# Patient Record
Sex: FEMALE | Race: WHITE | NOT HISPANIC OR LATINO | Employment: OTHER | URBAN - METROPOLITAN AREA
[De-identification: names, ages, dates, MRNs, and addresses within clinical notes are randomized per-mention and may not be internally consistent; named-entity substitution may affect disease eponyms.]

---

## 2022-02-01 ENCOUNTER — PRE-ADMISSION TESTING (OUTPATIENT)
Dept: PREADMISSION TESTING | Facility: HOSPITAL | Age: 57
End: 2022-02-01

## 2022-02-01 ENCOUNTER — HOSPITAL ENCOUNTER (OUTPATIENT)
Dept: GENERAL RADIOLOGY | Facility: HOSPITAL | Age: 57
Discharge: HOME | End: 2022-02-01

## 2022-02-01 VITALS
WEIGHT: 232.4 LBS | RESPIRATION RATE: 18 BRPM | SYSTOLIC BLOOD PRESSURE: 145 MMHG | HEIGHT: 66 IN | BODY MASS INDEX: 37.35 KG/M2 | HEART RATE: 89 BPM | DIASTOLIC BLOOD PRESSURE: 81 MMHG | TEMPERATURE: 98.8 F | OXYGEN SATURATION: 99 %

## 2022-02-01 LAB
ABO GROUP BLD: NORMAL
ALBUMIN SERPL-MCNC: 4.2 G/DL (ref 3.5–5.2)
ALBUMIN/GLOB SERPL: 1.7 G/DL
ALP SERPL-CCNC: 113 U/L (ref 39–117)
ALT SERPL W P-5'-P-CCNC: 20 U/L (ref 1–33)
ANION GAP SERPL CALCULATED.3IONS-SCNC: 11.1 MMOL/L (ref 5–15)
APTT PPP: 27.2 SECONDS (ref 22.7–35.4)
AST SERPL-CCNC: 28 U/L (ref 1–32)
BASOPHILS # BLD AUTO: 0.05 10*3/MM3 (ref 0–0.2)
BASOPHILS NFR BLD AUTO: 0.7 % (ref 0–1.5)
BILIRUB SERPL-MCNC: 0.3 MG/DL (ref 0–1.2)
BLD GP AB SCN SERPL QL: NEGATIVE
BUN SERPL-MCNC: 14 MG/DL (ref 6–20)
BUN/CREAT SERPL: 19.4 (ref 7–25)
CALCIUM SPEC-SCNC: 9.8 MG/DL (ref 8.6–10.5)
CHLORIDE SERPL-SCNC: 102 MMOL/L (ref 98–107)
CO2 SERPL-SCNC: 24.9 MMOL/L (ref 22–29)
CREAT SERPL-MCNC: 0.72 MG/DL (ref 0.57–1)
DEPRECATED RDW RBC AUTO: 42.3 FL (ref 37–54)
EOSINOPHIL # BLD AUTO: 0.5 10*3/MM3 (ref 0–0.4)
EOSINOPHIL NFR BLD AUTO: 6.6 % (ref 0.3–6.2)
ERYTHROCYTE [DISTWIDTH] IN BLOOD BY AUTOMATED COUNT: 14.6 % (ref 12.3–15.4)
GFR SERPL CREATININE-BSD FRML MDRD: 83 ML/MIN/1.73
GLOBULIN UR ELPH-MCNC: 2.5 GM/DL
GLUCOSE SERPL-MCNC: 201 MG/DL (ref 65–99)
HBA1C MFR BLD: 8.6 % (ref 4.8–5.6)
HCT VFR BLD AUTO: 35.5 % (ref 34–46.6)
HGB BLD-MCNC: 11.2 G/DL (ref 12–15.9)
IMM GRANULOCYTES # BLD AUTO: 0.02 10*3/MM3 (ref 0–0.05)
IMM GRANULOCYTES NFR BLD AUTO: 0.3 % (ref 0–0.5)
INR PPP: 1 (ref 0.9–1.1)
LYMPHOCYTES # BLD AUTO: 2.05 10*3/MM3 (ref 0.7–3.1)
LYMPHOCYTES NFR BLD AUTO: 26.9 % (ref 19.6–45.3)
MCH RBC QN AUTO: 25.2 PG (ref 26.6–33)
MCHC RBC AUTO-ENTMCNC: 31.5 G/DL (ref 31.5–35.7)
MCV RBC AUTO: 80 FL (ref 79–97)
MONOCYTES # BLD AUTO: 0.66 10*3/MM3 (ref 0.1–0.9)
MONOCYTES NFR BLD AUTO: 8.7 % (ref 5–12)
NEUTROPHILS NFR BLD AUTO: 4.35 10*3/MM3 (ref 1.7–7)
NEUTROPHILS NFR BLD AUTO: 56.8 % (ref 42.7–76)
NRBC BLD AUTO-RTO: 0 /100 WBC (ref 0–0.2)
PLATELET # BLD AUTO: 257 10*3/MM3 (ref 140–450)
PMV BLD AUTO: 11.8 FL (ref 6–12)
POTASSIUM SERPL-SCNC: 4.9 MMOL/L (ref 3.5–5.2)
PROT SERPL-MCNC: 6.7 G/DL (ref 6–8.5)
PROTHROMBIN TIME: 13.1 SECONDS (ref 11.7–14.2)
RBC # BLD AUTO: 4.44 10*6/MM3 (ref 3.77–5.28)
RH BLD: NEGATIVE
SODIUM SERPL-SCNC: 138 MMOL/L (ref 136–145)
T&S EXPIRATION DATE: NORMAL
WBC NRBC COR # BLD: 7.63 10*3/MM3 (ref 3.4–10.8)

## 2022-02-01 PROCEDURE — 80053 COMPREHEN METABOLIC PANEL: CPT

## 2022-02-01 PROCEDURE — 85610 PROTHROMBIN TIME: CPT

## 2022-02-01 PROCEDURE — 85025 COMPLETE CBC W/AUTO DIFF WBC: CPT

## 2022-02-01 PROCEDURE — 85730 THROMBOPLASTIN TIME PARTIAL: CPT

## 2022-02-01 PROCEDURE — 86900 BLOOD TYPING SEROLOGIC ABO: CPT

## 2022-02-01 PROCEDURE — 83036 HEMOGLOBIN GLYCOSYLATED A1C: CPT

## 2022-02-01 PROCEDURE — 86850 RBC ANTIBODY SCREEN: CPT

## 2022-02-01 PROCEDURE — 86901 BLOOD TYPING SEROLOGIC RH(D): CPT

## 2022-02-01 PROCEDURE — 36415 COLL VENOUS BLD VENIPUNCTURE: CPT

## 2022-02-01 PROCEDURE — 71046 X-RAY EXAM CHEST 2 VIEWS: CPT

## 2022-02-01 RX ORDER — MELATONIN
1000 DAILY
COMMUNITY

## 2022-02-01 RX ORDER — AMITRIPTYLINE HYDROCHLORIDE 25 MG/1
25 TABLET, FILM COATED ORAL NIGHTLY
COMMUNITY
Start: 2021-11-24

## 2022-02-01 RX ORDER — MOMETASONE FUROATE 50 UG/1
2 SPRAY, METERED NASAL AS NEEDED
COMMUNITY

## 2022-02-01 RX ORDER — MONTELUKAST SODIUM 10 MG/1
1 TABLET ORAL NIGHTLY
COMMUNITY
Start: 2021-08-27

## 2022-02-01 RX ORDER — LANOLIN ALCOHOL/MO/W.PET/CERES
3 CREAM (GRAM) TOPICAL NIGHTLY
COMMUNITY

## 2022-02-01 RX ORDER — PRAVASTATIN SODIUM 80 MG/1
80 TABLET ORAL NIGHTLY
COMMUNITY
Start: 2021-08-09 | End: 2022-05-06

## 2022-02-01 RX ORDER — OMEPRAZOLE 40 MG/1
40 CAPSULE, DELAYED RELEASE ORAL DAILY
COMMUNITY
Start: 2021-11-16

## 2022-02-01 RX ORDER — ZOLPIDEM TARTRATE 10 MG/1
10 TABLET ORAL NIGHTLY
COMMUNITY
Start: 2022-01-24

## 2022-02-01 RX ORDER — HYDROCODONE BITARTRATE AND ACETAMINOPHEN 7.5; 325 MG/1; MG/1
1 TABLET ORAL EVERY 6 HOURS PRN
COMMUNITY
End: 2022-02-16 | Stop reason: HOSPADM

## 2022-02-01 RX ORDER — PROMETHAZINE HYDROCHLORIDE 25 MG/1
25 TABLET ORAL EVERY 6 HOURS PRN
COMMUNITY

## 2022-02-01 RX ORDER — DICLOFENAC SODIUM 75 MG/1
75 TABLET, DELAYED RELEASE ORAL 2 TIMES DAILY
COMMUNITY

## 2022-02-01 RX ORDER — LEVOTHYROXINE SODIUM 0.15 MG/1
150 TABLET ORAL DAILY
COMMUNITY
Start: 2021-12-03

## 2022-02-01 RX ORDER — MAGNESIUM OXIDE 400 MG/1
500 TABLET ORAL DAILY
COMMUNITY

## 2022-02-01 RX ORDER — ATENOLOL 50 MG/1
50 TABLET ORAL DAILY
COMMUNITY
Start: 2022-01-05 | End: 2022-04-05

## 2022-02-01 RX ORDER — AMOXICILLIN 250 MG
2 CAPSULE ORAL 2 TIMES DAILY PRN
COMMUNITY

## 2022-02-01 RX ORDER — RIZATRIPTAN BENZOATE 10 MG/1
10 TABLET, ORALLY DISINTEGRATING ORAL ONCE AS NEEDED
COMMUNITY

## 2022-02-01 RX ORDER — METFORMIN HYDROCHLORIDE 500 MG/1
2 TABLET, EXTENDED RELEASE ORAL 2 TIMES DAILY
COMMUNITY
Start: 2021-11-16

## 2022-02-01 RX ORDER — CYCLOBENZAPRINE HCL 10 MG
10 TABLET ORAL 2 TIMES DAILY
COMMUNITY
Start: 2021-11-29

## 2022-02-01 ASSESSMENT — KOOS JR
KOOS JR SCORE: 47.487
KOOS JR SCORE: 16

## 2022-02-01 NOTE — DISCHARGE INSTRUCTIONS
Take the following medications the morning of surgery:  ATENOLOL,LEVOTHYROXINE AND OMEPRAZOLE    If you are on prescription narcotic pain medication to control your pain you may also take that medication the morning of surgery.    General Instructions:  • Do not eat solid food after midnight the night before surgery.  • You may drink clear liquids day of surgery but must stop at least one hour before your hospital arrival time.  CLEAR LIQUIDS UNTIL 4:15 AM  • It is beneficial for you to have a clear drink that contains carbohydrates the day of surgery.  We suggest a 12 to 20 ounce bottle of Gatorade or Powerade for non-diabetic patients or a 12 to 20 ounce bottle of G2 or Powerade Zero for diabetic patients. (Pediatric patients, are not advised to drink a 12 to 20 ounce carbohydrate drink)    Clear liquids are liquids you can see through.  Nothing red in color.     Plain water                               Sports drinks  Sodas                                   Gelatin (Jell-O)  Fruit juices without pulp such as white grape juice and apple juice  Popsicles that contain no fruit or yogurt  Tea or coffee (no cream or milk added)  Gatorade / Powerade  G2 / Powerade Zero    • Infants may have breast milk up to four hours before surgery.  • Infants drinking formula may drink formula up to six hours before surgery.   • Patients who avoid smoking, chewing tobacco and alcohol for 4 weeks prior to surgery have a reduced risk of post-operative complications.  Quit smoking as many days before surgery as you can.  • Do not smoke, use chewing tobacco or drink alcohol the day of surgery.   • If applicable bring your C-PAP/ BI-PAP machine.  • Bring any papers given to you in the doctor’s office.  • Wear clean comfortable clothes.  • Do not wear contact lenses, false eyelashes or make-up.  Bring a case for your glasses.   • Bring crutches or walker if applicable.  • Remove all piercings.  Leave jewelry and any other valuables at  home.  • Hair extensions with metal clips must be removed prior to surgery.  • The Pre-Admission Testing nurse will instruct you to bring medications if unable to obtain an accurate list in Pre-Admission Testing.        If you were given a blood bank ID arm band remember to bring it with you the day of surgery.    Preventing a Surgical Site Infection:  • For 2 to 3 days before surgery, avoid shaving with a razor because the razor can irritate skin and make it easier to develop an infection.    • Any areas of open skin can increase the risk of a post-operative wound infection by allowing bacteria to enter and travel throughout the body.  Notify your surgeon if you have any skin wounds / rashes even if it is not near the expected surgical site.  The area will need assessed to determine if surgery should be delayed until it is healed.  • The night prior to surgery shower using a fresh bar of anti-bacterial soap (such as Dial) and clean washcloth.  Sleep in a clean bed with clean clothing.  Do not allow pets to sleep with you.  • Shower on the morning of surgery using a fresh bar of anti-bacterial soap (such as Dial) and clean washcloth.  Dry with a clean towel and dress in clean clothing.  • Ask your surgeon if you will be receiving antibiotics prior to surgery.  • Make sure you, your family, and all healthcare providers clean their hands with soap and water or an alcohol based hand  before caring for you or your wound.    Day of surgery: 2/15/2022 ARRIVAL TIME 5:15 AM  Your arrival time is approximately two hours before your scheduled surgery time.  Upon arrival, a Pre-op nurse and Anesthesiologist will review your health history, obtain vital signs, and answer questions you may have.  The only belongings needed at this time will be a list of your home medications and if applicable your C-PAP/BI-PAP machine.  A Pre-op nurse will start an IV and you may receive medication in preparation for surgery, including  something to help you relax.     Please be aware that surgery does come with discomfort.  We want to make every effort to control your discomfort so please discuss any uncontrolled symptoms with your nurse.   Your doctor will most likely have prescribed pain medications.      If you are going home after surgery you will receive individualized written care instructions before being discharged.  A responsible adult must drive you to and from the hospital on the day of your surgery and stay with you for 24 hours.  Discharge prescriptions can be filled by the hospital pharmacy during regular pharmacy hours.  If you are having surgery late in the day/evening your prescription may be e-prescribed to your pharmacy.  Please verify your pharmacy hours or chose a 24 hour pharmacy to avoid not having access to your prescription because your pharmacy has closed for the day.    If you are staying overnight following surgery, you will be transported to your hospital room following the recovery period.  [facility] has all private rooms.    If you have any questions please call Pre-Admission Testing at [phone].  Deductibles and co-payments are collected on the day of service. Please be prepared to pay the required co-pay, deductible or deposit on the day of service as defined by your plan.    Patient Education for Self-Quarantine Process    • Following your COVID testing, we strongly recommend that you wear a mask when you are with other people and practice social distancing.   • Limit your activities to only required outings.  • Wash your hands with soap and water frequently for at least 20 seconds.   • Avoid touching your eyes, nose and mouth with unwashed hands.  • Do not share anything - utensils, drinking glasses, food from the same bowl.   • Sanitize household surfaces daily. Include all high touch areas (door handles, light switches, phones, countertops, etc.)    Call your surgeon immediately if you  experience any of the following symptoms:  • Sore Throat  • Shortness of Breath or difficulty breathing  • Cough  • Chills  • Body soreness or muscle pain  • Headache  • Fever  • New loss of taste or smell  • Do not arrive for your surgery ill.  Your procedure will need to be rescheduled to another time.  You will need to call your physician before the day of surgery to avoid any unnecessary exposure to hospital staff as well as other patients.    CHLORHEXIDINE CLOTH INSTRUCTIONS  The morning of surgery follow these instructions using the Chlorhexidine cloths you've been given.  These steps reduce bacteria on the body.  Do not use the cloths near your eyes, ears mouth, genitalia or on open wounds.  Throw the cloths away after use but do not try to flush them down a toilet.      • Open and remove one cloth at a time from the package.    • Leave the cloth unfolded and begin the bathing.  • Massage the skin with the cloths using gentle pressure to remove bacteria.  Do not scrub harshly.   • Follow the steps below with one 2% CHG cloth per area (6 total cloths).  • One cloth for neck, shoulders and chest.  • One cloth for both arms, hands, fingers and underarms (do underarms last).  • One cloth for the abdomen followed by groin.  • One cloth for right leg and foot including between the toes.  • One cloth for left leg and foot including between the toes.  • The last cloth is to be used for the back of the neck, back and buttocks.    Allow the CHG to air dry 3 minutes on the skin which will give it time to work and decrease the chance of irritation.  The skin may feel sticky until it is dry.  Do not rinse with water or any other liquid or you will lose the beneficial effects of the CHG.  If mild skin irritation occurs, do rinse the skin to remove the CHG.  Report this to the nurse at time of admission.  Do not apply lotions, creams, ointments, deodorants or perfumes after using the clothes. Dress in clean clothes before  coming to the hospital.    BACTROBAN NASAL OINTMENT  There are many germs normally in your nose. Bactroban is an ointment that will help reduce these germs. Please follow these instructions for Bactroban use:      ____The day before surgery in the morning  Date_2/14_______    ____The day before surgery in the evening              Date_2/14_______    ____The day of surgery in the morning    Date_2/15_______    **Squirt ½ package of Bactroban Ointment onto a cotton applicator and apply to inside of 1st nostril.  Squirt the remaining Bactroban and apply to the inside of the other nostril.

## 2022-02-12 ENCOUNTER — LAB (OUTPATIENT)
Dept: LAB | Facility: HOSPITAL | Age: 57
End: 2022-02-12

## 2022-02-12 LAB — SARS-COV-2 ORF1AB RESP QL NAA+PROBE: NOT DETECTED

## 2022-02-12 PROCEDURE — U0004 COV-19 TEST NON-CDC HGH THRU: HCPCS

## 2022-02-12 PROCEDURE — C9803 HOPD COVID-19 SPEC COLLECT: HCPCS

## 2022-02-15 ENCOUNTER — APPOINTMENT (OUTPATIENT)
Dept: GENERAL RADIOLOGY | Facility: HOSPITAL | Age: 57
End: 2022-02-15

## 2022-02-15 ENCOUNTER — HOSPITAL ENCOUNTER (OUTPATIENT)
Facility: HOSPITAL | Age: 57
Discharge: HOME | End: 2022-02-16
Attending: ORTHOPAEDIC SURGERY | Admitting: ORTHOPAEDIC SURGERY

## 2022-02-15 ENCOUNTER — ANESTHESIA (OUTPATIENT)
Dept: PERIOP | Facility: HOSPITAL | Age: 57
End: 2022-02-15

## 2022-02-15 ENCOUNTER — ANESTHESIA EVENT (OUTPATIENT)
Dept: PERIOP | Facility: HOSPITAL | Age: 57
End: 2022-02-15

## 2022-02-15 DIAGNOSIS — Z96.651 STATUS POST TOTAL KNEE REPLACEMENT, RIGHT: Primary | ICD-10-CM

## 2022-02-15 LAB
ABO GROUP BLD: NORMAL
BLD GP AB SCN SERPL QL: NEGATIVE
GLUCOSE BLDC GLUCOMTR-MCNC: 130 MG/DL (ref 70–130)
GLUCOSE BLDC GLUCOMTR-MCNC: 183 MG/DL (ref 70–130)
GLUCOSE BLDC GLUCOMTR-MCNC: 231 MG/DL (ref 70–130)
GLUCOSE BLDC GLUCOMTR-MCNC: 254 MG/DL (ref 70–130)
RH BLD: NEGATIVE
T&S EXPIRATION DATE: NORMAL

## 2022-02-15 PROCEDURE — 0 CEFAZOLIN IN DEXTROSE 2-4 GM/100ML-% SOLUTION: Performed by: ORTHOPAEDIC SURGERY

## 2022-02-15 PROCEDURE — 86850 RBC ANTIBODY SCREEN: CPT | Performed by: ORTHOPAEDIC SURGERY

## 2022-02-15 PROCEDURE — 97161 PT EVAL LOW COMPLEX 20 MIN: CPT

## 2022-02-15 PROCEDURE — A9270 NON-COVERED ITEM OR SERVICE: HCPCS | Performed by: ORTHOPAEDIC SURGERY

## 2022-02-15 PROCEDURE — 63710000001 AMITRIPTYLINE 25 MG TABLET: Performed by: ORTHOPAEDIC SURGERY

## 2022-02-15 PROCEDURE — 25010000002 PROPOFOL 10 MG/ML EMULSION: Performed by: NURSE ANESTHETIST, CERTIFIED REGISTERED

## 2022-02-15 PROCEDURE — 25010000002 FENTANYL CITRATE (PF) 50 MCG/ML SOLUTION: Performed by: NURSE ANESTHETIST, CERTIFIED REGISTERED

## 2022-02-15 PROCEDURE — 0 BUPIVACAINE LIPOSOME 1.3 % SUSPENSION 20 ML VIAL: Performed by: ORTHOPAEDIC SURGERY

## 2022-02-15 PROCEDURE — 25010000002 ROPIVACAINE PER 1 MG: Performed by: ANESTHESIOLOGY

## 2022-02-15 PROCEDURE — 63710000001 PRAVASTATIN 40 MG TABLET: Performed by: ORTHOPAEDIC SURGERY

## 2022-02-15 PROCEDURE — 25010000002 ONDANSETRON PER 1 MG: Performed by: NURSE ANESTHETIST, CERTIFIED REGISTERED

## 2022-02-15 PROCEDURE — G0378 HOSPITAL OBSERVATION PER HR: HCPCS

## 2022-02-15 PROCEDURE — 63710000001 CHOLECALCIFEROL 25 MCG (1000 UT) TABLET: Performed by: ORTHOPAEDIC SURGERY

## 2022-02-15 PROCEDURE — 86900 BLOOD TYPING SEROLOGIC ABO: CPT | Performed by: ORTHOPAEDIC SURGERY

## 2022-02-15 PROCEDURE — 73560 X-RAY EXAM OF KNEE 1 OR 2: CPT

## 2022-02-15 PROCEDURE — C1776 JOINT DEVICE (IMPLANTABLE): HCPCS | Performed by: ORTHOPAEDIC SURGERY

## 2022-02-15 PROCEDURE — 25010000002 DEXAMETHASONE PER 1 MG: Performed by: NURSE ANESTHETIST, CERTIFIED REGISTERED

## 2022-02-15 PROCEDURE — C1713 ANCHOR/SCREW BN/BN,TIS/BN: HCPCS | Performed by: ORTHOPAEDIC SURGERY

## 2022-02-15 PROCEDURE — 63710000001 SENNOSIDES-DOCUSATE 8.6-50 MG TABLET: Performed by: ORTHOPAEDIC SURGERY

## 2022-02-15 PROCEDURE — C9290 INJ, BUPIVACAINE LIPOSOME: HCPCS | Performed by: ORTHOPAEDIC SURGERY

## 2022-02-15 PROCEDURE — 63710000001 METFORMIN ER 500 MG TABLET SUSTAINED-RELEASE 24 HOUR: Performed by: ORTHOPAEDIC SURGERY

## 2022-02-15 PROCEDURE — 63710000001 ZOLPIDEM 5 MG TABLET: Performed by: ORTHOPAEDIC SURGERY

## 2022-02-15 PROCEDURE — 63710000001 CALCIUM CARBONATE (OYSTER SHELL) 500 MG TABLET: Performed by: ORTHOPAEDIC SURGERY

## 2022-02-15 PROCEDURE — C1889 IMPLANT/INSERT DEVICE, NOC: HCPCS | Performed by: ORTHOPAEDIC SURGERY

## 2022-02-15 PROCEDURE — 25010000002 MIDAZOLAM PER 1 MG: Performed by: ANESTHESIOLOGY

## 2022-02-15 PROCEDURE — 63710000001 INSULIN LISPRO (HUMAN) PER 5 UNITS: Performed by: ORTHOPAEDIC SURGERY

## 2022-02-15 PROCEDURE — 63710000001 MAGNESIUM OXIDE 400 MG TABLET: Performed by: ORTHOPAEDIC SURGERY

## 2022-02-15 PROCEDURE — 63710000001 MONTELUKAST 10 MG TABLET: Performed by: ORTHOPAEDIC SURGERY

## 2022-02-15 PROCEDURE — 82962 GLUCOSE BLOOD TEST: CPT

## 2022-02-15 PROCEDURE — 0 MEPIVACAINE HCL (PF) 1.5 % SOLUTION: Performed by: ANESTHESIOLOGY

## 2022-02-15 PROCEDURE — 76942 ECHO GUIDE FOR BIOPSY: CPT | Performed by: ORTHOPAEDIC SURGERY

## 2022-02-15 PROCEDURE — 63710000001 DIAZEPAM 5 MG TABLET: Performed by: ORTHOPAEDIC SURGERY

## 2022-02-15 PROCEDURE — 63710000001 FLUTICASONE 50 MCG/ACT SUSPENSION 16 G BOTTLE: Performed by: ORTHOPAEDIC SURGERY

## 2022-02-15 PROCEDURE — 86901 BLOOD TYPING SEROLOGIC RH(D): CPT | Performed by: ORTHOPAEDIC SURGERY

## 2022-02-15 PROCEDURE — 97110 THERAPEUTIC EXERCISES: CPT

## 2022-02-15 PROCEDURE — 63710000001 POVIDONE-IODINE 10 % SOLUTION 15 ML BOTTLE: Performed by: ORTHOPAEDIC SURGERY

## 2022-02-15 PROCEDURE — 25010000002 FENTANYL CITRATE (PF) 50 MCG/ML SOLUTION: Performed by: ANESTHESIOLOGY

## 2022-02-15 PROCEDURE — 25010000002 NEOSTIGMINE 5 MG/10ML SOLUTION: Performed by: NURSE ANESTHETIST, CERTIFIED REGISTERED

## 2022-02-15 PROCEDURE — 25010000002 KETOROLAC TROMETHAMINE PER 15 MG: Performed by: ORTHOPAEDIC SURGERY

## 2022-02-15 PROCEDURE — 25010000002 HYDROMORPHONE PER 4 MG: Performed by: NURSE ANESTHETIST, CERTIFIED REGISTERED

## 2022-02-15 PROCEDURE — 63710000001 HYDROCODONE-ACETAMINOPHEN 10-325 MG TABLET: Performed by: ORTHOPAEDIC SURGERY

## 2022-02-15 DEVICE — VIOLET ANTIBACTERIAL POLYDIOXANONE, KNOTLESS TISSUE CONTROL DEVICE
Type: IMPLANTABLE DEVICE | Site: KNEE | Status: FUNCTIONAL
Brand: STRATAFIX

## 2022-02-15 DEVICE — CAP TOTL KN CMT PRIMARY: Type: IMPLANTABLE DEVICE | Site: KNEE | Status: FUNCTIONAL

## 2022-02-15 DEVICE — ART/SRF KN PERSONA PS EF MC 6TO7 10MM RT: Type: IMPLANTABLE DEVICE | Site: KNEE | Status: FUNCTIONAL

## 2022-02-15 DEVICE — IMPLANTABLE DEVICE
Type: IMPLANTABLE DEVICE | Site: KNEE | Status: FUNCTIONAL
Brand: BIOMET® BONE CEMENT R

## 2022-02-15 DEVICE — EXT STEM FEM/KN PERSONA TPR 14XPLS30MM: Type: IMPLANTABLE DEVICE | Site: KNEE | Status: FUNCTIONAL

## 2022-02-15 DEVICE — PAT KN PERSONA VE CRS/LNK CMT 8.5X32MM: Type: IMPLANTABLE DEVICE | Site: KNEE | Status: FUNCTIONAL

## 2022-02-15 DEVICE — CAP EXT STEM KN UPCHRG: Type: IMPLANTABLE DEVICE | Site: KNEE | Status: FUNCTIONAL

## 2022-02-15 DEVICE — KNOTLESS TISSUE CONTROL DEVICE, UNDYED UNIDIRECTIONAL (ANTIBACTERIAL) SYNTHETIC ABSORBABLE DEVICE
Type: IMPLANTABLE DEVICE | Site: KNEE | Status: FUNCTIONAL
Brand: STRATAFIX

## 2022-02-15 DEVICE — COMP FEM/KN PERSONA CR CMT COCR STD SZ7 RT: Type: IMPLANTABLE DEVICE | Site: KNEE | Status: FUNCTIONAL

## 2022-02-15 DEVICE — STEM TIB/KN PERSONA CMT 5D SZE RT: Type: IMPLANTABLE DEVICE | Site: KNEE | Status: FUNCTIONAL

## 2022-02-15 RX ORDER — NICOTINE POLACRILEX 4 MG
15 LOZENGE BUCCAL
Status: DISCONTINUED | OUTPATIENT
Start: 2022-02-15 | End: 2022-02-16 | Stop reason: HOSPADM

## 2022-02-15 RX ORDER — PROMETHAZINE HYDROCHLORIDE 12.5 MG/1
12.5 TABLET ORAL EVERY 6 HOURS PRN
Status: DISCONTINUED | OUTPATIENT
Start: 2022-02-15 | End: 2022-02-16 | Stop reason: HOSPADM

## 2022-02-15 RX ORDER — MAGNESIUM OXIDE 400 MG/1
400 TABLET ORAL DAILY
Status: DISCONTINUED | OUTPATIENT
Start: 2022-02-15 | End: 2022-02-16 | Stop reason: HOSPADM

## 2022-02-15 RX ORDER — CEFAZOLIN SODIUM 2 G/100ML
2 INJECTION, SOLUTION INTRAVENOUS ONCE
Status: COMPLETED | OUTPATIENT
Start: 2022-02-15 | End: 2022-02-15

## 2022-02-15 RX ORDER — DEXTROSE MONOHYDRATE 25 G/50ML
25 INJECTION, SOLUTION INTRAVENOUS
Status: DISCONTINUED | OUTPATIENT
Start: 2022-02-15 | End: 2022-02-16 | Stop reason: HOSPADM

## 2022-02-15 RX ORDER — OXYCODONE AND ACETAMINOPHEN 7.5; 325 MG/1; MG/1
1 TABLET ORAL EVERY 4 HOURS PRN
Status: DISCONTINUED | OUTPATIENT
Start: 2022-02-15 | End: 2022-02-15 | Stop reason: HOSPADM

## 2022-02-15 RX ORDER — KETAMINE HYDROCHLORIDE 10 MG/ML
INJECTION INTRAMUSCULAR; INTRAVENOUS AS NEEDED
Status: DISCONTINUED | OUTPATIENT
Start: 2022-02-15 | End: 2022-02-15 | Stop reason: SURG

## 2022-02-15 RX ORDER — METFORMIN HYDROCHLORIDE 500 MG/1
1000 TABLET, EXTENDED RELEASE ORAL 2 TIMES DAILY
Status: DISCONTINUED | OUTPATIENT
Start: 2022-02-15 | End: 2022-02-16 | Stop reason: HOSPADM

## 2022-02-15 RX ORDER — PROMETHAZINE HYDROCHLORIDE 25 MG/1
25 SUPPOSITORY RECTAL ONCE AS NEEDED
Status: DISCONTINUED | OUTPATIENT
Start: 2022-02-15 | End: 2022-02-15 | Stop reason: HOSPADM

## 2022-02-15 RX ORDER — MELATONIN
1000 DAILY
Status: DISCONTINUED | OUTPATIENT
Start: 2022-02-15 | End: 2022-02-16 | Stop reason: HOSPADM

## 2022-02-15 RX ORDER — DEXAMETHASONE SODIUM PHOSPHATE 10 MG/ML
INJECTION INTRAMUSCULAR; INTRAVENOUS AS NEEDED
Status: DISCONTINUED | OUTPATIENT
Start: 2022-02-15 | End: 2022-02-15 | Stop reason: SURG

## 2022-02-15 RX ORDER — MONTELUKAST SODIUM 10 MG/1
10 TABLET ORAL NIGHTLY
Status: DISCONTINUED | OUTPATIENT
Start: 2022-02-15 | End: 2022-02-16 | Stop reason: HOSPADM

## 2022-02-15 RX ORDER — CEFAZOLIN SODIUM 2 G/100ML
2 INJECTION, SOLUTION INTRAVENOUS EVERY 8 HOURS
Status: COMPLETED | OUTPATIENT
Start: 2022-02-15 | End: 2022-02-16

## 2022-02-15 RX ORDER — FLUMAZENIL 0.1 MG/ML
0.2 INJECTION INTRAVENOUS AS NEEDED
Status: DISCONTINUED | OUTPATIENT
Start: 2022-02-15 | End: 2022-02-15 | Stop reason: HOSPADM

## 2022-02-15 RX ORDER — NEOSTIGMINE METHYLSULFATE 0.5 MG/ML
INJECTION, SOLUTION INTRAVENOUS AS NEEDED
Status: DISCONTINUED | OUTPATIENT
Start: 2022-02-15 | End: 2022-02-15 | Stop reason: SURG

## 2022-02-15 RX ORDER — ONDANSETRON 4 MG/1
4 TABLET, FILM COATED ORAL EVERY 6 HOURS PRN
Status: DISCONTINUED | OUTPATIENT
Start: 2022-02-15 | End: 2022-02-16 | Stop reason: HOSPADM

## 2022-02-15 RX ORDER — OMEGA-3S/DHA/EPA/FISH OIL 1000-1400
3 CAPSULE,DELAYED RELEASE (ENTERIC COATED) ORAL DAILY
COMMUNITY

## 2022-02-15 RX ORDER — LABETALOL HYDROCHLORIDE 5 MG/ML
5 INJECTION, SOLUTION INTRAVENOUS
Status: DISCONTINUED | OUTPATIENT
Start: 2022-02-15 | End: 2022-02-15 | Stop reason: HOSPADM

## 2022-02-15 RX ORDER — SODIUM CHLORIDE 0.9 % (FLUSH) 0.9 %
10 SYRINGE (ML) INJECTION EVERY 12 HOURS SCHEDULED
Status: DISCONTINUED | OUTPATIENT
Start: 2022-02-15 | End: 2022-02-15 | Stop reason: HOSPADM

## 2022-02-15 RX ORDER — SODIUM CHLORIDE 0.9 % (FLUSH) 0.9 %
10 SYRINGE (ML) INJECTION AS NEEDED
Status: DISCONTINUED | OUTPATIENT
Start: 2022-02-15 | End: 2022-02-15 | Stop reason: HOSPADM

## 2022-02-15 RX ORDER — FENTANYL CITRATE 50 UG/ML
INJECTION, SOLUTION INTRAMUSCULAR; INTRAVENOUS AS NEEDED
Status: DISCONTINUED | OUTPATIENT
Start: 2022-02-15 | End: 2022-02-15 | Stop reason: SURG

## 2022-02-15 RX ORDER — ZOLPIDEM TARTRATE 5 MG/1
10 TABLET ORAL NIGHTLY PRN
Status: DISCONTINUED | OUTPATIENT
Start: 2022-02-15 | End: 2022-02-16 | Stop reason: HOSPADM

## 2022-02-15 RX ORDER — PANTOPRAZOLE SODIUM 40 MG/1
40 TABLET, DELAYED RELEASE ORAL EVERY MORNING
Status: DISCONTINUED | OUTPATIENT
Start: 2022-02-16 | End: 2022-02-16 | Stop reason: HOSPADM

## 2022-02-15 RX ORDER — HYDROCODONE BITARTRATE AND ACETAMINOPHEN 10; 325 MG/1; MG/1
2 TABLET ORAL EVERY 4 HOURS PRN
Status: DISCONTINUED | OUTPATIENT
Start: 2022-02-15 | End: 2022-02-16 | Stop reason: HOSPADM

## 2022-02-15 RX ORDER — ROPIVACAINE HYDROCHLORIDE 2 MG/ML
INJECTION, SOLUTION EPIDURAL; INFILTRATION; PERINEURAL
Status: COMPLETED | OUTPATIENT
Start: 2022-02-15 | End: 2022-02-15

## 2022-02-15 RX ORDER — SODIUM CHLORIDE, SODIUM LACTATE, POTASSIUM CHLORIDE, CALCIUM CHLORIDE 600; 310; 30; 20 MG/100ML; MG/100ML; MG/100ML; MG/100ML
9 INJECTION, SOLUTION INTRAVENOUS CONTINUOUS PRN
Status: DISCONTINUED | OUTPATIENT
Start: 2022-02-15 | End: 2022-02-16 | Stop reason: HOSPADM

## 2022-02-15 RX ORDER — HYDROMORPHONE HYDROCHLORIDE 1 MG/ML
0.5 INJECTION, SOLUTION INTRAMUSCULAR; INTRAVENOUS; SUBCUTANEOUS
Status: DISCONTINUED | OUTPATIENT
Start: 2022-02-15 | End: 2022-02-16 | Stop reason: HOSPADM

## 2022-02-15 RX ORDER — DIAZEPAM 5 MG/1
5 TABLET ORAL EVERY 6 HOURS PRN
Status: DISCONTINUED | OUTPATIENT
Start: 2022-02-15 | End: 2022-02-16 | Stop reason: HOSPADM

## 2022-02-15 RX ORDER — DIPHENHYDRAMINE HYDROCHLORIDE 50 MG/ML
12.5 INJECTION INTRAMUSCULAR; INTRAVENOUS
Status: DISCONTINUED | OUTPATIENT
Start: 2022-02-15 | End: 2022-02-15 | Stop reason: HOSPADM

## 2022-02-15 RX ORDER — GLYCOPYRROLATE 0.2 MG/ML
INJECTION INTRAMUSCULAR; INTRAVENOUS AS NEEDED
Status: DISCONTINUED | OUTPATIENT
Start: 2022-02-15 | End: 2022-02-15 | Stop reason: SURG

## 2022-02-15 RX ORDER — KETOROLAC TROMETHAMINE 30 MG/ML
30 INJECTION, SOLUTION INTRAMUSCULAR; INTRAVENOUS EVERY 6 HOURS PRN
Status: DISCONTINUED | OUTPATIENT
Start: 2022-02-15 | End: 2022-02-16 | Stop reason: HOSPADM

## 2022-02-15 RX ORDER — MIDAZOLAM HYDROCHLORIDE 1 MG/ML
INJECTION INTRAMUSCULAR; INTRAVENOUS
Status: COMPLETED | OUTPATIENT
Start: 2022-02-15 | End: 2022-02-15

## 2022-02-15 RX ORDER — HYDROCODONE BITARTRATE AND ACETAMINOPHEN 10; 325 MG/1; MG/1
1 TABLET ORAL EVERY 4 HOURS PRN
Status: DISCONTINUED | OUTPATIENT
Start: 2022-02-15 | End: 2022-02-16 | Stop reason: HOSPADM

## 2022-02-15 RX ORDER — MIDAZOLAM HYDROCHLORIDE 1 MG/ML
1 INJECTION INTRAMUSCULAR; INTRAVENOUS
Status: DISCONTINUED | OUTPATIENT
Start: 2022-02-15 | End: 2022-02-15 | Stop reason: HOSPADM

## 2022-02-15 RX ORDER — ECHINACEA 400 MG
1000 CAPSULE ORAL DAILY
COMMUNITY

## 2022-02-15 RX ORDER — FENTANYL CITRATE 50 UG/ML
50 INJECTION, SOLUTION INTRAMUSCULAR; INTRAVENOUS
Status: DISCONTINUED | OUTPATIENT
Start: 2022-02-15 | End: 2022-02-15 | Stop reason: HOSPADM

## 2022-02-15 RX ORDER — PROPOFOL 10 MG/ML
VIAL (ML) INTRAVENOUS AS NEEDED
Status: DISCONTINUED | OUTPATIENT
Start: 2022-02-15 | End: 2022-02-15 | Stop reason: SURG

## 2022-02-15 RX ORDER — FLUTICASONE PROPIONATE 50 MCG
1 SPRAY, SUSPENSION (ML) NASAL DAILY
Status: DISCONTINUED | OUTPATIENT
Start: 2022-02-15 | End: 2022-02-16 | Stop reason: HOSPADM

## 2022-02-15 RX ORDER — NALOXONE HCL 0.4 MG/ML
0.1 VIAL (ML) INJECTION
Status: DISCONTINUED | OUTPATIENT
Start: 2022-02-15 | End: 2022-02-16 | Stop reason: HOSPADM

## 2022-02-15 RX ORDER — ROCURONIUM BROMIDE 10 MG/ML
INJECTION, SOLUTION INTRAVENOUS AS NEEDED
Status: DISCONTINUED | OUTPATIENT
Start: 2022-02-15 | End: 2022-02-15 | Stop reason: SURG

## 2022-02-15 RX ORDER — ATENOLOL 50 MG/1
50 TABLET ORAL DAILY
Status: DISCONTINUED | OUTPATIENT
Start: 2022-02-16 | End: 2022-02-16 | Stop reason: HOSPADM

## 2022-02-15 RX ORDER — SODIUM CHLORIDE, SODIUM LACTATE, POTASSIUM CHLORIDE, CALCIUM CHLORIDE 600; 310; 30; 20 MG/100ML; MG/100ML; MG/100ML; MG/100ML
100 INJECTION, SOLUTION INTRAVENOUS CONTINUOUS
Status: ACTIVE | OUTPATIENT
Start: 2022-02-15 | End: 2022-02-15

## 2022-02-15 RX ORDER — LIDOCAINE HYDROCHLORIDE 20 MG/ML
INJECTION, SOLUTION INFILTRATION; PERINEURAL AS NEEDED
Status: DISCONTINUED | OUTPATIENT
Start: 2022-02-15 | End: 2022-02-15 | Stop reason: SURG

## 2022-02-15 RX ORDER — ONDANSETRON 2 MG/ML
4 INJECTION INTRAMUSCULAR; INTRAVENOUS ONCE AS NEEDED
Status: COMPLETED | OUTPATIENT
Start: 2022-02-15 | End: 2022-02-15

## 2022-02-15 RX ORDER — CALCIUM CARBONATE 500(1250)
1000 TABLET ORAL DAILY
Status: DISCONTINUED | OUTPATIENT
Start: 2022-02-15 | End: 2022-02-16 | Stop reason: HOSPADM

## 2022-02-15 RX ORDER — HYDROCODONE BITARTRATE AND ACETAMINOPHEN 7.5; 325 MG/1; MG/1
1 TABLET ORAL ONCE AS NEEDED
Status: DISCONTINUED | OUTPATIENT
Start: 2022-02-15 | End: 2022-02-15 | Stop reason: HOSPADM

## 2022-02-15 RX ORDER — HYDRALAZINE HYDROCHLORIDE 20 MG/ML
5 INJECTION INTRAMUSCULAR; INTRAVENOUS
Status: DISCONTINUED | OUTPATIENT
Start: 2022-02-15 | End: 2022-02-15 | Stop reason: HOSPADM

## 2022-02-15 RX ORDER — FENTANYL CITRATE 50 UG/ML
INJECTION, SOLUTION INTRAMUSCULAR; INTRAVENOUS
Status: COMPLETED | OUTPATIENT
Start: 2022-02-15 | End: 2022-02-15

## 2022-02-15 RX ORDER — FAMOTIDINE 10 MG/ML
20 INJECTION, SOLUTION INTRAVENOUS
Status: COMPLETED | OUTPATIENT
Start: 2022-02-15 | End: 2022-02-15

## 2022-02-15 RX ORDER — AMITRIPTYLINE HYDROCHLORIDE 25 MG/1
25 TABLET, FILM COATED ORAL NIGHTLY
Status: DISCONTINUED | OUTPATIENT
Start: 2022-02-15 | End: 2022-02-16 | Stop reason: HOSPADM

## 2022-02-15 RX ORDER — MAGNESIUM HYDROXIDE 1200 MG/15ML
LIQUID ORAL AS NEEDED
Status: DISCONTINUED | OUTPATIENT
Start: 2022-02-15 | End: 2022-02-15 | Stop reason: HOSPADM

## 2022-02-15 RX ORDER — INSULIN LISPRO 100 [IU]/ML
0-9 INJECTION, SOLUTION INTRAVENOUS; SUBCUTANEOUS
Status: DISCONTINUED | OUTPATIENT
Start: 2022-02-15 | End: 2022-02-16 | Stop reason: HOSPADM

## 2022-02-15 RX ORDER — ASPIRIN 81 MG/1
81 TABLET ORAL EVERY 12 HOURS SCHEDULED
Status: DISCONTINUED | OUTPATIENT
Start: 2022-02-16 | End: 2022-02-16 | Stop reason: HOSPADM

## 2022-02-15 RX ORDER — EPHEDRINE SULFATE 50 MG/ML
INJECTION, SOLUTION INTRAVENOUS AS NEEDED
Status: DISCONTINUED | OUTPATIENT
Start: 2022-02-15 | End: 2022-02-15 | Stop reason: SURG

## 2022-02-15 RX ORDER — ONDANSETRON 2 MG/ML
4 INJECTION INTRAMUSCULAR; INTRAVENOUS EVERY 6 HOURS PRN
Status: DISCONTINUED | OUTPATIENT
Start: 2022-02-15 | End: 2022-02-16 | Stop reason: HOSPADM

## 2022-02-15 RX ORDER — NALOXONE HCL 0.4 MG/ML
0.2 VIAL (ML) INJECTION AS NEEDED
Status: DISCONTINUED | OUTPATIENT
Start: 2022-02-15 | End: 2022-02-15 | Stop reason: HOSPADM

## 2022-02-15 RX ORDER — PROMETHAZINE HYDROCHLORIDE 25 MG/1
25 TABLET ORAL ONCE AS NEEDED
Status: DISCONTINUED | OUTPATIENT
Start: 2022-02-15 | End: 2022-02-15 | Stop reason: HOSPADM

## 2022-02-15 RX ORDER — PRAVASTATIN SODIUM 40 MG
80 TABLET ORAL NIGHTLY
Status: DISCONTINUED | OUTPATIENT
Start: 2022-02-15 | End: 2022-02-16 | Stop reason: HOSPADM

## 2022-02-15 RX ORDER — PROMETHAZINE HYDROCHLORIDE 12.5 MG/1
12.5 SUPPOSITORY RECTAL EVERY 6 HOURS PRN
Status: DISCONTINUED | OUTPATIENT
Start: 2022-02-15 | End: 2022-02-16 | Stop reason: HOSPADM

## 2022-02-15 RX ORDER — EPHEDRINE SULFATE 50 MG/ML
5 INJECTION, SOLUTION INTRAVENOUS ONCE AS NEEDED
Status: DISCONTINUED | OUTPATIENT
Start: 2022-02-15 | End: 2022-02-15 | Stop reason: HOSPADM

## 2022-02-15 RX ORDER — AMOXICILLIN 250 MG
1 CAPSULE ORAL 2 TIMES DAILY
Status: DISCONTINUED | OUTPATIENT
Start: 2022-02-15 | End: 2022-02-16 | Stop reason: HOSPADM

## 2022-02-15 RX ORDER — ONDANSETRON 2 MG/ML
INJECTION INTRAMUSCULAR; INTRAVENOUS AS NEEDED
Status: DISCONTINUED | OUTPATIENT
Start: 2022-02-15 | End: 2022-02-15 | Stop reason: SURG

## 2022-02-15 RX ORDER — DIPHENHYDRAMINE HCL 25 MG
25 CAPSULE ORAL
Status: DISCONTINUED | OUTPATIENT
Start: 2022-02-15 | End: 2022-02-15 | Stop reason: HOSPADM

## 2022-02-15 RX ORDER — TRANEXAMIC ACID 100 MG/ML
INJECTION, SOLUTION INTRAVENOUS AS NEEDED
Status: DISCONTINUED | OUTPATIENT
Start: 2022-02-15 | End: 2022-02-15 | Stop reason: SURG

## 2022-02-15 RX ORDER — HYDROMORPHONE HYDROCHLORIDE 1 MG/ML
0.5 INJECTION, SOLUTION INTRAMUSCULAR; INTRAVENOUS; SUBCUTANEOUS
Status: DISCONTINUED | OUTPATIENT
Start: 2022-02-15 | End: 2022-02-15 | Stop reason: HOSPADM

## 2022-02-15 RX ORDER — LEVOTHYROXINE SODIUM 0.15 MG/1
150 TABLET ORAL DAILY
Status: DISCONTINUED | OUTPATIENT
Start: 2022-02-16 | End: 2022-02-16 | Stop reason: HOSPADM

## 2022-02-15 RX ADMIN — HYDROCODONE BITARTRATE AND ACETAMINOPHEN 1 TABLET: 10; 325 TABLET ORAL at 22:28

## 2022-02-15 RX ADMIN — INSULIN LISPRO 4 UNITS: 100 INJECTION, SOLUTION INTRAVENOUS; SUBCUTANEOUS at 17:35

## 2022-02-15 RX ADMIN — Medication 1000 MG: at 17:36

## 2022-02-15 RX ADMIN — KETOROLAC TROMETHAMINE 30 MG: 30 INJECTION, SOLUTION INTRAMUSCULAR at 12:04

## 2022-02-15 RX ADMIN — FENTANYL CITRATE 50 MCG: 0.05 INJECTION, SOLUTION INTRAMUSCULAR; INTRAVENOUS at 08:19

## 2022-02-15 RX ADMIN — MIDAZOLAM 2 MG: 1 INJECTION INTRAMUSCULAR; INTRAVENOUS at 08:19

## 2022-02-15 RX ADMIN — FENTANYL CITRATE 100 MCG: 0.05 INJECTION, SOLUTION INTRAMUSCULAR; INTRAVENOUS at 09:25

## 2022-02-15 RX ADMIN — ROCURONIUM BROMIDE 50 MG: 50 INJECTION INTRAVENOUS at 09:25

## 2022-02-15 RX ADMIN — EPHEDRINE SULFATE 10 MG: 50 INJECTION INTRAVENOUS at 10:34

## 2022-02-15 RX ADMIN — ONDANSETRON 4 MG: 2 INJECTION INTRAMUSCULAR; INTRAVENOUS at 11:34

## 2022-02-15 RX ADMIN — ZOLPIDEM TARTRATE 10 MG: 5 TABLET ORAL at 23:51

## 2022-02-15 RX ADMIN — HYDROCODONE BITARTRATE AND ACETAMINOPHEN 2 TABLET: 10; 325 TABLET ORAL at 13:11

## 2022-02-15 RX ADMIN — EPHEDRINE SULFATE 20 MG: 50 INJECTION INTRAVENOUS at 10:35

## 2022-02-15 RX ADMIN — PROPOFOL 30 MG: 10 INJECTION, EMULSION INTRAVENOUS at 09:27

## 2022-02-15 RX ADMIN — FENTANYL CITRATE 50 MCG: 50 INJECTION INTRAMUSCULAR; INTRAVENOUS at 11:20

## 2022-02-15 RX ADMIN — HYDROMORPHONE HYDROCHLORIDE 0.5 MG: 1 INJECTION, SOLUTION INTRAMUSCULAR; INTRAVENOUS; SUBCUTANEOUS at 11:34

## 2022-02-15 RX ADMIN — METFORMIN HYDROCHLORIDE 1000 MG: 500 TABLET, EXTENDED RELEASE ORAL at 20:41

## 2022-02-15 RX ADMIN — MAGNESIUM OXIDE 400 MG (241.3 MG MAGNESIUM) TABLET 400 MG: TABLET at 17:36

## 2022-02-15 RX ADMIN — TRANEXAMIC ACID 1000 MG: 1 INJECTION, SOLUTION INTRAVENOUS at 09:38

## 2022-02-15 RX ADMIN — HYDROMORPHONE HYDROCHLORIDE 0.5 MG: 1 INJECTION, SOLUTION INTRAMUSCULAR; INTRAVENOUS; SUBCUTANEOUS at 11:47

## 2022-02-15 RX ADMIN — HYDROCODONE BITARTRATE AND ACETAMINOPHEN 2 TABLET: 10; 325 TABLET ORAL at 17:34

## 2022-02-15 RX ADMIN — DIAZEPAM 5 MG: 5 TABLET ORAL at 20:41

## 2022-02-15 RX ADMIN — SODIUM CHLORIDE, POTASSIUM CHLORIDE, SODIUM LACTATE AND CALCIUM CHLORIDE 9 ML/HR: 600; 310; 30; 20 INJECTION, SOLUTION INTRAVENOUS at 07:21

## 2022-02-15 RX ADMIN — DOCUSATE SODIUM 50MG AND SENNOSIDES 8.6MG 1 TABLET: 8.6; 5 TABLET, FILM COATED ORAL at 20:42

## 2022-02-15 RX ADMIN — EPHEDRINE SULFATE 10 MG: 50 INJECTION INTRAVENOUS at 08:07

## 2022-02-15 RX ADMIN — PROPOFOL 20 MG: 10 INJECTION, EMULSION INTRAVENOUS at 09:42

## 2022-02-15 RX ADMIN — Medication 1000 UNITS: at 17:36

## 2022-02-15 RX ADMIN — FAMOTIDINE 20 MG: 10 INJECTION INTRAVENOUS at 08:08

## 2022-02-15 RX ADMIN — NEOSTIGMINE METHYLSULFATE 3.5 MG: 0.5 INJECTION INTRAVENOUS at 10:54

## 2022-02-15 RX ADMIN — LIDOCAINE HYDROCHLORIDE 60 MG: 20 INJECTION, SOLUTION INFILTRATION; PERINEURAL at 09:25

## 2022-02-15 RX ADMIN — CEFAZOLIN SODIUM 2 G: 2 INJECTION, SOLUTION INTRAVENOUS at 09:08

## 2022-02-15 RX ADMIN — GLYCOPYRROLATE 0.5 MG: 0.2 INJECTION INTRAMUSCULAR; INTRAVENOUS at 10:54

## 2022-02-15 RX ADMIN — MEPIVACAINE HYDROCHLORIDE 10 ML: 15 INJECTION, SOLUTION EPIDURAL; INFILTRATION at 08:25

## 2022-02-15 RX ADMIN — ROPIVACAINE HYDROCHLORIDE 20 ML: 2 INJECTION, SOLUTION EPIDURAL; INFILTRATION at 08:25

## 2022-02-15 RX ADMIN — ONDANSETRON 4 MG: 2 INJECTION INTRAMUSCULAR; INTRAVENOUS at 10:27

## 2022-02-15 RX ADMIN — MONTELUKAST SODIUM 10 MG: 10 TABLET, FILM COATED ORAL at 20:42

## 2022-02-15 RX ADMIN — KETAMINE HYDROCHLORIDE 50 MG: 10 INJECTION INTRAMUSCULAR; INTRAVENOUS at 09:52

## 2022-02-15 RX ADMIN — FLUTICASONE PROPIONATE 1 SPRAY: 50 SPRAY, METERED NASAL at 17:34

## 2022-02-15 RX ADMIN — PRAVASTATIN SODIUM 80 MG: 40 TABLET ORAL at 20:41

## 2022-02-15 RX ADMIN — AMITRIPTYLINE HYDROCHLORIDE 25 MG: 25 TABLET, FILM COATED ORAL at 20:41

## 2022-02-15 RX ADMIN — SODIUM CHLORIDE, POTASSIUM CHLORIDE, SODIUM LACTATE AND CALCIUM CHLORIDE 100 ML/HR: 600; 310; 30; 20 INJECTION, SOLUTION INTRAVENOUS at 12:55

## 2022-02-15 RX ADMIN — PROPOFOL 150 MG: 10 INJECTION, EMULSION INTRAVENOUS at 09:25

## 2022-02-15 RX ADMIN — CEFAZOLIN SODIUM 2 G: 2 INJECTION, SOLUTION INTRAVENOUS at 17:35

## 2022-02-15 RX ADMIN — DEXAMETHASONE SODIUM PHOSPHATE 8 MG: 10 INJECTION INTRAMUSCULAR; INTRAVENOUS at 09:33

## 2022-02-15 NOTE — ANESTHESIA PROCEDURE NOTES
Peripheral Block    Pre-sedation assessment completed: 2/15/2022 8:25 AM    Patient reassessed immediately prior to procedure    Patient location during procedure: holding area  Start time: 2/15/2022 8:25 AM  Stop time: 2/15/2022 8:35 AM  Reason for block: at surgeon's request and post-op pain management  Performed by  Anesthesiologist: Andriy Nichols MD  Preanesthetic Checklist  Completed: patient identified, IV checked, site marked, risks and benefits discussed, surgical consent, monitors and equipment checked, pre-op evaluation and timeout performed  Prep:  Pt Position: supine  Sterile barriers:cap, gloves, gown, mask and sterile barriers  Prep: ChloraPrep  Patient monitoring: blood pressure monitoring, continuous pulse oximetry and EKG  Procedure    Sedation: yes  Performed under: local infiltration  Guidance:ultrasound guided    ULTRASOUND INTERPRETATION.  Using ultrasound guidance a 21 G gauge needle was placed in close proximity to the femoral nerve, at which point, under ultrasound guidance anesthetic was injected in the area of the nerve and spread of the anesthesia was seen on ultrasound in close proximity thereto.  There were no abnormalities seen on ultrasound; a digital image was taken; and the patient tolerated the procedure with no complications. Images:still images obtained    Laterality:right  Block Type:adductor canal block  Injection Technique:single-shot  Needle Type:echogenic  Needle Gauge:21 G      Medications Used: Mepivacaine HCl (PF) (CARBOCAINE) 1.5 % injection, 10 mL  ropivacaine (NAROPIN) 0.2 % injection, 20 mL      Medications  Comment:Ultrasound Interpretation: Using ultrasound guidance, the needle was placed in close proximity to the target nerve and anesthetic was injected in the area of the target nerve and/or bundles, and spread of the anesthetic was seen on ultrasound in close proximity thereto.  There were no abnormalities seen on ultrasound; a digital / physical image  was taken; and the patient tolerated the procedure with no complications.   Block placed for postoperative pain control per surgeon request.    Post Assessment  Injection Assessment: negative aspiration for heme, no paresthesia on injection and incremental injection  Patient Tolerance:comfortable throughout block  Complications:no

## 2022-02-15 NOTE — OP NOTE
TOTAL KNEE ARTHROPLASTY  Procedure Note    Shantell Royal  2/15/2022    Pre-op Diagnosis: Right knee Osteoarthritis  Post-op Diagnosis:  Same  Procedure: Right Total Knee Arthroplasty  Surgeon:  Tung Sandoval MD  Assistant: MARIAJOSE Petty  Anesthesia: General with Block, Anesthesiologist: Marilynn Amador MD  CRNA: Makayla Roldan CRNA  Staff: Circulator: Andrés Ness RN  Scrub Person: Mouna Velasco  Vendor Representative: Ervin Vaz  Assistant: Andriy Gonsales APRN CFA  Estimated Blood Loss: 100ml  Specimens:   Order Name Source Comment Collection Info Order Time   TYPE AND SCREEN   Collected By: Christy Escudero RN 2/15/2022  7:54 AM     Release to patient   Immediate          Drains: none  Complications: None    Components Utilized:   Sarahi  Implant Name Type Inv. Item Serial No.  Lot No. LRB No. Used Action   DEV CONTRL TISS STRATAFIX SYMM PDS PLUS ANNE CT-1 45CM - [id number] Implant DEV CONTRL TISS STRATAFIX SYMM PDS PLUS ANNE CT-1 45CM  [employer]  [employer] [id number] Right 1 Implanted   DEV CONTRL TISS STRATAFIX SYMM PDS PLUS ANNE CT-1 45CM - [id number] Implant DEV CONTRL TISS STRATAFIX SYMM PDS PLUS ANNE CT-1 45CM  [employer]  DIV [employer] [id number] Right 1 Implanted   DEV CONTRL TISS STRATAFIXSPIRALMNCRYL PLSPS2 REV3/0 45CM - [id number] Implant DEV CONTRL TISS STRATAFIXSPIRALMNCRYL PLSPS2 REV3/0 45CM  [employer]  DIV OF J AND J [id number] Right 1 Implanted   CMT BONE R 1X40 - [id number] Implant CMT BONE R 1X40  [employer] [id number] Right 1 Implanted   CMT BONE R 1X40 - [id number] Implant CMT BONE R 1X40  [employer] [id number] Right 1 Implanted   EXT STEM FEM/KN PERSONA TPR [id number] - [id number] Implant EXT STEM FEM/KN PERSONA TPR [id number]  [employer] [id number] Right 1 Implanted   STEM TIB PERSONA CMT 5D ROMERO RT - [id number] Implant STEM TIB PERSONA CMT 5D ROMERO RT  [employer] [id number] Right 1 Implanted   PAT KN PERSONA VE CRS/LNK CMT 32MM - [id number] Implant  PAT KN PERSONA VE CRS/LNK CMT 32MM  [employer] [id number] Right 1 Implanted   COMP FEM/KN PERSONA CR CMT COCR STD SZ7 RT - [id number] Implant COMP FEM/KN PERSONA CR CMT COCR STD SZ7 RT  [employer] [id number] Right 1 Implanted   ART/SRF KN PERSONA PS EF MC 6TO7 10MM RT - [id number] Implant ART/SRF KN PERSONA PS EF MC 6TO7 10MM RT  [employer] [id number] Right 1 Implanted         Indication for Procedure:  The patient is a 56 y.o. female presents today for a total knee arthroplasty procedure because of failure to conservatively manage the patient's pain for arthritis.  She was educated in risks of surgery that could include possible risk of infection, deep venous thrombosis, pulmonary embolism, fracture, neurovascular injury, leg length discrepancy, dislocation, possible persistent pain, need for additional surgeries, anesthetic risks, medical risks including heart attack and stroke, and death.  The discussion occurred in the office pre-operatively, and patient had the opportunity to ask questions, and concerns about the proposed surgery.  She wished to proceed.      Protocols for intravenous antibiotics and venous thrombosis were followed for this patient.  IV antibiotics were infused prior to surgery and will be discontinued within 24 hours of completion of the surgical procedure.  Thrombosis prophylaxis will be initiated within 24 hours of the completion of the surgical procedure.      Procedure:  The patient was seen in the preoperative holding area where her right knee surgical site was marked, preoperative antibiotics were received, a preoperative nerve block was performed.  She was taken to the operating room and placed on the operating table.  The correct right lower extremity was prepped and draped in a typical sterile fashion.  A timeout was performed confirming the correct surgical site and procedure.  Esmarch was used to exsanguinate the leg and tourniquet was inflated to 250 mmHg.    A 10 blade scalpel  was used to make a longitudinal incision from above the patella to medial to the tibial tubercle.  A medial georgina-patellar arthrotomy was performed with another 10 blade scalpel.  The medial joint line was elevated subperiosteally with electrocautery and a Shah elevator.  The patellar fat pad was removed.  The patella was everted, measured, and osteotomy cut completed.  The patella guide and drill was used to finish preparing the patella.  A patella protecting guide was placed, and the patella was everted off the side of the femur laterally.      The knee was then flexed and Natanael's line was identified.  The drill was placed into the distal femur into the medullary canal.  The intramedullary distal femoral valgus cutting guide set to 5 degrees of femoral valgus with +2 mm cut secondary to a flexion contracture.  It was then placed into the medullary canal.  It was pinned and the oscillating saw was used to make the osteotomy.  The anterior referencing distal femoral guide was placed and the above femoral size 7 was measured.  Three degrees of external rotation was set.  The 4 in 1 femoral cutting guide was placed and pinned and the oscillating saw was used to make the appropriate cuts.      The extramedullary cutting guide was placed aligned with the tibial eminence superiorly and the tibial shaft distally, pinned 4 mm from the medial tibial plateau.  Tibial slope was accounted for.  The oscillating saw was used to complete the osteotomy cuts.    A lamina  was placed medially and then laterally to remove the medial and lateral meniscus and the posterior osteophytes.  The tibial baseplate was placed on the tibial surface with a drop sammy to confirm an appropriate cut and size of implant.  This was a size E. It was then pinned externally rotated to the medial third of the tibial tubercle.  The trial reduction was performed with the above implants and was found to be stable in all planes.  The patella tracked  centrally on the trochlear groove.     The lug holes were drilled in the distal femur.  The tibia was drilled and broached in the typical fashion accounting for a stem extension.  The trial components were removed and the final implants were confirmed and opened.  The bone surfaces were then irrigated and dried.  Standard Zakia[employer] cement was mixed and placed on the cut bone surfaces and back side of the implants.  The implants were impacted into place, and the trial polyethylene spacer was placed.  The knee was placed into extension.  A stack of towels was placed under the ankle and the cement was allowed to harden. The tourniquet was then dropped.  Hemostasis was obtained.  The wound was then irrigated with the pulse lavage irrigation system with a 3 liter mixture of betadine and normal saline.  The local anesthetic mixture was injected throughout the knee for post-operative pain control.  The final size 10 mm medial congruent polyethylene implant was then inserted and the knee was flexed to 90 degrees.  The arthrotomy was closed with #1 Vicryl suture and #1 Stratafix suture.  The subcutaneous tissue was closed with a series of #1 Vicryl suture and 2-0 Vicryl suture.  The skin was closed with 3-0 Stratafix suture.  Dermabond, Telfa, Tegaderm, and Ace bandage were applied to the knee.    Sponge and needle counts were completed and were correct.  The patient was awakened from anesthetic and was returned to recovery in stable condition.    Postoperative Plan:  The patient will be admitted.  She will be started on Aspirin 81 mg BID for 4 weeks for dvt prophylaxis and have sequential compression devices.  She will be on a 24 hour antibiotic protocol.  She will be weight bearing as tolerated with physical therapy.    MARIAJOSE Petty assisted for the entirety of the surgical procedure.  He was necessary for retraction, implant placement, and closure of the wound.    No complications were encountered during  this surgical procedure.      Tung Sandoval MD     Date: 2/15/2022  Time: 10:57 EST

## 2022-02-15 NOTE — ANESTHESIA PROCEDURE NOTES
Airway  Urgency: elective    Date/Time: 2/15/2022 9:28 AM  Airway not difficult    General Information and Staff    Patient location during procedure: OR  CRNA: Makayla Roldan CRNA    Indications and Patient Condition  Indications for airway management: airway protection    Preoxygenated: yes  Mask difficulty assessment: 1 - vent by mask    Final Airway Details  Final airway type: endotracheal airway      Successful airway: ETT  Cuffed: yes   Successful intubation technique: direct laryngoscopy  Endotracheal tube insertion site: oral  Blade: Daysi  Blade size: 3  ETT size (mm): 7.0  Cormack-Lehane Classification: grade I - full view of glottis  Placement verified by: chest auscultation and capnometry   Measured from: lips  ETT/EBT  to lips (cm): 21  Number of attempts at approach: 1  Assessment: lips, teeth, and gum same as pre-op and atraumatic intubation    Additional Comments   ett cuff up at MOP

## 2022-02-15 NOTE — PLAN OF CARE
Goal Outcome Evaluation:  Plan of Care Reviewed With: patient           Outcome Summary: POD 0 R TKA.  Pt reports normally independent, uses a cane just outside home due to knee pain.  Today pt demonstrates impaired R knee ROm, strength and gait pattern but ableto ambulat w/ contact assist and RW, few cues to improve gait pattern.  No equip needs.  Recommend home w/ home PT.

## 2022-02-15 NOTE — H&P
"     Orthopaedic H&P      Patient: Shantell Royal    Date of Admission: 2/15/2022  6:45 AM    YOB: 1965    Medical Record Number: [id number]    Attending Physician:  Tung Sandoval MD    Chief Complaints: Right knee osteoarthritis      History of Present Illness: 56 y.o. female admitted to [facility] with right knee osteoarthritis.  She is here for right knee replacement. Onset of symptoms was gradual starting several years ago.  Symptoms are associated with right knee pain.  Symptoms are aggravated by weightbearing.   Symptoms improve with rest.  The patient has tried multiple conservative modalities through the years.  She has end-stage osteoarthritis.  She would like to move forward with a right knee replacement.    Allergies:   Allergies   Allergen Reactions   • Eletriptan Other (See Comments)     \"chest pain\"   • Sulfa Antibiotics Itching and Rash   • Sumatriptan Other (See Comments)     \"tightening in throat\" but denies SOA   • Zolmitriptan Other (See Comments)     Makes migraine worse   • Caffeine Other (See Comments)     Can't sleep, tachycardia   • Cisapride Other (See Comments)     \"Hurts stomach\"   • Methylnaltrexone Itching and Rash   • Oxycodone-Acetaminophen Itching and Rash       Medications:   Home Medications:  Medications Prior to Admission   Medication Sig Dispense Refill Last Dose   • amitriptyline (ELAVIL) 25 MG tablet Take 25 mg by mouth Every Night.   2/14/2022 at 2330   • Ascorbic Acid (VITAMIN C PO) Take 1,000 mg by mouth Daily. HOLD PRIOR TO SURG    Past Week at Unknown time   • atenolol (TENORMIN) 50 MG tablet Take 50 mg by mouth Daily.   2/15/2022 at 0525   • cholecalciferol (Cholecalciferol) 25 MCG (1000 UT) tablet Take 1,000 Units by mouth Daily.   Past Week at Unknown time   • cyclobenzaprine (FLEXERIL) 10 MG tablet Take 10 mg by mouth 2 (Two) Times a Day.   2/14/2022 at Unknown time   • diclofenac (VOLTAREN) 75 MG EC tablet Take 75 mg by mouth 2 (Two) " Times a Day. INSTRUCTED PT TO FOLLOW MD INSTRUCTIONS REGARDING HOLDING FOR SURGERY   Past Week at Unknown time   • Flaxseed, Linseed, (Flaxseed Oil) 1000 MG capsule Take 1,000 mg by mouth Daily.   Past Week at Unknown time   • HYDROcodone-acetaminophen (NORCO) 7.5-325 MG per tablet Take 1 tablet by mouth Every 6 (Six) Hours As Needed for Moderate Pain .   2/15/2022 at 0525   • L-Lysine 1000 MG tablet Take 1 tablet by mouth As Needed.   Past Week at Unknown time   • levothyroxine (SYNTHROID, LEVOTHROID) 150 MCG tablet Take 150 mcg by mouth Daily.   2/15/2022 at 0500   • magnesium oxide (MAG-OX) 400 MG tablet Take 500 mg by mouth Daily.   Past Week at Unknown time   • melatonin 3 MG tablet Take 3 mg by mouth Every Night.   Past Week at Unknown time   • metFORMIN ER (GLUCOPHAGE-XR) 500 MG 24 hr tablet Take 2 tablets by mouth 2 (Two) Times a Day.   2/15/2022 at 0525   • mometasone (NASONEX) 50 MCG/ACT nasal spray 2 sprays into the nostril(s) as directed by provider As Needed.   Past Week at Unknown time   • montelukast (SINGULAIR) 10 MG tablet Take 1 tablet by mouth Every Night.   2/14/2022 at 2330   • Multiple Vitamins-Minerals (Hair/Skin/Nails) capsule Take 3 capsules by mouth Daily.   Past Week at Unknown time   • Multiple Vitamins-Minerals (ZINC PO) Take 1 tablet by mouth Daily. HOLD PRIOR TO SURG    Past Week at Unknown time   • omeprazole (priLOSEC) 40 MG capsule Take 40 mg by mouth Daily.   2/15/2022 at 0525   • promethazine (PHENERGAN) 25 MG tablet Take 25 mg by mouth Every 6 (Six) Hours As Needed for Nausea or Vomiting.   Past Month at Unknown time   • psyllium (METAMUCIL) 0.52 g capsule Take 0.52 g by mouth Daily.   2/14/2022 at 0800   • Rhubarb (ESTROVEN MENOPAUSE RELIEF PO) Take 1 tablet by mouth Every Night.   Past Week at Unknown time   • rizatriptan MLT (MAXALT-MLT) 10 MG disintegrating tablet Place 10 mg on the tongue 1 (One) Time As Needed for Migraine. May repeat in 2 hours if needed   Past Week at  Unknown time   • sennosides-docusate (PERICOLACE) 8.6-50 MG per tablet Take 2 tablets by mouth 2 (Two) Times a Day As Needed.   Past Week at Unknown time   • Calcium-Magnesium-Vitamin D (CALCIUM 1200+D3 PO) Take  by mouth. HOLD PRIOR TO SURG   More than a month at Unknown time   • pravastatin (PRAVACHOL) 80 MG tablet Take 80 mg by mouth Every Night.   2022   • zolpidem (AMBIEN) 10 MG tablet Take 10 mg by mouth Every Night.   2022       Current Medications:  Scheduled Meds:ceFAZolin, 2 g, Intravenous, Once  sodium chloride, 10 mL, Intravenous, Q12H      Continuous Infusions:lactated ringers, 9 mL/hr, Last Rate: 9 mL/hr (02/15/22 0721)      PRN Meds:.lactated ringers  •  midazolam  •  sodium chloride    Past Medical History:   Diagnosis Date   • Anesthesia complication     AFTER C SECTION LOST VISION FOR 24 HOURS   • Anesthesia complication     DIAPHRAGM PARALYSED AFTER SHOULDER SURGERY   • Anxiety    • Arthritis     ALL OVER   • Chronic pain     BACK   • Constipation    • DDD (degenerative disc disease), lumbar    • Diabetes mellitus (HCC)    • Disease of thyroid gland    • Diverticular disease    • GERD (gastroesophageal reflux disease)    • History of COVID-19     2021   • Hyperlipidemia    • Hypertension    • Migraine    • PONV (postoperative nausea and vomiting)    • PPD positive     1 YEAR OF TREATMENT   • Right knee pain    • Seasonal allergies    • Sleep apnea     USES CPAP RARELY   • Tooth ache     PATIENT IS HAVING TOOTH PULLED 2022 SHE HAS NOTIFIED DR SALTER OFFICE.   • Umbilical hernia      Past Surgical History:   Procedure Laterality Date   • APPENDECTOMY     • CARPAL TUNNEL RELEASE Right    •  SECTION     • GALLBLADDER SURGERY     • HERNIA REPAIR     • KNEE ARTHROSCOPY Right    • LUMBAR FUSION     • ROTATOR CUFF REPAIR Right    • SALPINGO OOPHORECTOMY Left    • TONSILLECTOMY       Social History     Occupational History   • Not on file   Tobacco Use   • Smoking status: Never  Smoker   • Smokeless tobacco: Never Used   Vaping Use   • Vaping Use: Never used   Substance and Sexual Activity   • Alcohol use: Not Currently   • Drug use: Not Currently   • Sexual activity: Not on file      Social History     Social History Narrative   • Not on file     Family History   Problem Relation Age of Onset   • Learning disabilities Neg Hx          Review of Systems:   No other pertinent positives or negatives other than what is mentioned in the HPI and below.  Constitutional: Negative for fatigue, fever, or weight loss  HEENT: No active headache.  Pulmonary: Patient denies SOA.  Cardiovascular: Patient denies any chest pain.  Gastrointestinal:  Patient denies active vomiting or diarrhea.  Musculoskeletal: Positive for right knee pain.  Neurological: Patient denies active dizziness or loss of consciousness.  Skin: Patient denies any active bleeding.    Vital signs in last 24 hours:  Temp:  [98.4 °F (36.9 °C)] 98.4 °F (36.9 °C)  Heart Rate:  [75-86] 81  Resp:  [12-18] 18  BP: (121-143)/(73-90) 121/73  Vitals:    02/15/22 0821 02/15/22 0826 02/15/22 0830 02/15/22 0831   BP: 142/90   121/73   BP Location: Right arm   Right arm   Patient Position: Lying   Lying   Pulse: 82 75 81 81   Resp: 13 15 14 18   Temp:       TempSrc:       SpO2: 96% 100% 100% 96%   Weight:       Height:              Physical Exam: 56 y.o. female         General Appearance:  Alert, cooperative, in no acute distress    HEENT:    Atraumatic, Pupils are equal   Neck:   Cervical spine midline, no appreciable JVD   Lungs:     Breathing non-labored and chest rise symmetric    Heart:   Abdomen:     Rectal:    Extremities:   Pulses  Neurovascular:   Skin:  Musculoskeletal:         Pulse regular    Soft, Non-tender or distended    Deferred    No clubbing, cyanosis, or edema    Intact    Cranial nerves 2 - 12 grossly intact, sensation intact    No skin lesions  Right knee varus deformity.  Normal motor and sensory exam.  Compartments are soft.   No skin lesions.     Assessment:  Right knee osteoarthritis      Plan:  The patient voiced understanding of the risks, benefits, and alternative forms of treatment that were discussed and the patient consents to proceed with right total knee arthroplasty as planned.  No changes.  All questions answered.     Date: 2/15/2022  Tung Sandoval MD

## 2022-02-15 NOTE — ANESTHESIA PREPROCEDURE EVALUATION
Anesthesia Evaluation     Patient summary reviewed   history of anesthetic complications: PONV  NPO Solid Status: > 8 hours             Airway   Mallampati: II  Neck ROM: full  No difficulty expected  Dental      Pulmonary    (+) sleep apnea,   Cardiovascular     Rhythm: regular    (+) hypertension,       Neuro/Psych  GI/Hepatic/Renal/Endo    (+) obesity,   diabetes mellitus,     Musculoskeletal     Abdominal    Substance History      OB/GYN          Other   arthritis,                      Anesthesia Plan    ASA 3     general with block       Anesthetic plan, all risks, benefits, and alternatives have been provided, discussed and informed consent has been obtained with: patient.  Use of blood products discussed with patient .       CODE STATUS:

## 2022-02-15 NOTE — THERAPY EVALUATION
Patient Name: Shantell Royal  : 1965    MRN: [id number]                              Today's Date: 2/15/2022       Admit Date: 2/15/2022    Visit Dx: No diagnosis found.  Patient Active Problem List   Diagnosis   • Status post total knee replacement, right     Past Medical History:   Diagnosis Date   • Anesthesia complication     AFTER C SECTION LOST VISION FOR 24 HOURS   • Anesthesia complication     DIAPHRAGM PARALYSED AFTER SHOULDER SURGERY   • Anxiety    • Arthritis     ALL OVER   • Chronic pain     BACK   • Constipation    • DDD (degenerative disc disease), lumbar    • Diabetes mellitus (HCC)    • Disease of thyroid gland    • Diverticular disease    • GERD (gastroesophageal reflux disease)    • History of COVID-19     2021   • Hyperlipidemia    • Hypertension    • Migraine    • PONV (postoperative nausea and vomiting)    • PPD positive     1 YEAR OF TREATMENT   • Right knee pain    • Seasonal allergies    • Sleep apnea     USES CPAP RARELY   • Tooth ache     PATIENT IS HAVING TOOTH PULLED 2022 SHE HAS NOTIFIED DR SALTER OFFICE.   • Umbilical hernia      Past Surgical History:   Procedure Laterality Date   • APPENDECTOMY     • CARPAL TUNNEL RELEASE Right    •  SECTION     • GALLBLADDER SURGERY     • HERNIA REPAIR     • KNEE ARTHROSCOPY Right    • LUMBAR FUSION     • ROTATOR CUFF REPAIR Right    • SALPINGO OOPHORECTOMY Left    • TONSILLECTOMY        General Information     Row Name 02/15/22 1346          Physical Therapy Time and Intention    Document Type evaluation  -AR     Mode of Treatment physical therapy  -AR     Row Name 02/15/22 1346          General Information    Patient Profile Reviewed yes  -AR     Prior Level of Function independent:  -AR     Existing Precautions/Restrictions fall  -AR     Row Name 02/15/22 1346          Living Environment    Lives With spouse  -AR     Row Name 02/15/22 1346          Home Main Entrance    Number of Stairs, Main Entrance three  -AR     Stair  Railings, Main Entrance railings on both sides of stairs  -AR     Row Name 02/15/22 1346          Cognition    Orientation Status (Cognition) oriented x 4  -AR     Row Name 02/15/22 1346          Safety Issues, Functional Mobility    Impairments Affecting Function (Mobility) balance; endurance/activity tolerance; strength; pain; range of motion (ROM)  -AR           User Key  (r) = Recorded By, (t) = Taken By, (c) = Cosigned By    Initials Name Provider Type    AR Delia Rosenberg, PT Physical Therapist               Mobility     Row Name 02/15/22 1347          Bed Mobility    Bed Mobility supine-sit; sit-supine  -AR     Supine-Sit [place] (Bed Mobility) standby assist  -AR     Sit-Supine [place] (Bed Mobility) standby assist  -AR     Assistive Device (Bed Mobility) bed rails; head of bed elevated  -AR     Row Name 02/15/22 1347          Sit-Stand Transfer    Sit-Stand [place] (Transfers) contact guard  -AR     Assistive Device (Sit-Stand Transfers) walker, front-wheeled  -AR     Row Name 02/15/22 1347          Gait/Stairs (Locomotion)    [place] Level (Gait) contact guard  -AR     Assistive Device (Gait) walker, front-wheeled  -AR     Distance in Feet (Gait) 75'  -AR     Deviations/Abnormal Patterns (Gait) gait speed decreased  -AR     Right Sided Gait Deviations weight shift ability decreased; heel strike decreased  -AR           User Key  (r) = Recorded By, (t) = Taken By, (c) = Cosigned By    Initials Name Provider Type    AR Delia Rosenberg, PT Physical Therapist               Obj/Interventions     Row Name 02/15/22 1349          Range of Motion Comprehensive    Comment, General Range of Motion WFL except R knee  -AR     Row Name 02/15/22 1349          Strength Comprehensive (MMT)    Comment, General Manual Muscle Testing (MMT) Assessment WFl except  RLE  -AR     Row Name 02/15/22 1349          Motor Skills    Therapeutic Exercise --  R TKA ex proram 5x w/ occasional cues to focus  -AR      Row Name 02/15/22 9089          Balance    Balance Assessment sitting static balance; standing dynamic balance; standing static balance  -AR     Static Sitting Balance WFL  -AR     Static Standing Balance supported; WFL  -AR     Dynamic Standing Balance mild impairment; supported  -AR           User Key  (r) = Recorded By, (t) = Taken By, (c) = Cosigned By    Initials Name Provider Type    AR Delia Rosenberg, PT Physical Therapist               Goals/Plan     Row Name 02/15/22 1351          Transfer Goal 1 (PT)    Activity/Assistive Device (Transfer Goal 1, PT) sit-to-stand/stand-to-sit; bed-to-chair/chair-to-bed; walker, rolling  -AR     [place] Level/Cues Needed (Transfer Goal 1, PT) contact guard assist  -AR     Time Frame (Transfer Goal 1, PT) 1 week  -AR     Row Name 02/15/22 1351          Gait Training Goal 1 (PT)    Activity/Assistive Device (Gait Training Goal 1, PT) gait (walking locomotion); walker, rolling  -AR     [place] Level (Gait Training Goal 1, PT) contact guard assist  -AR     Distance (Gait Training Goal 1, PT) 100  -AR     Time Frame (Gait Training Goal 1, PT) 1 week  -AR     [facility] Name 02/15/22 1351          ROM Goal 1 (PT)    ROM Goal 1 (PT) R knee 0-90  -AR     Time Frame (ROM Goal 1, PT) 1 week  -AR           User Key  (r) = Recorded By, (t) = Taken By, (c) = Cosigned By    Initials Name Provider Type    AR Delia Rosenberg, PT Physical Therapist               Clinical Impression     Row Name 02/15/22 9367          Pain    Additional Documentation Pain Scale: Numbers Pre/Post-Treatment (Group)  -AR     Row Name 02/15/22 8365          Pain Scale: Numbers Pre/Post-Treatment    Pretreatment Pain Rating 2/10  -AR     Posttreatment Pain Rating 4/10  -AR     Pain Location - Side Right  -AR     Pain Location knee  -AR     Pain Intervention(s) Medication (See MAR); Repositioned; Cold applied  -AR     Row Name 02/15/22 1974          Plan of Care Review    Plan of Care Reviewed With patient   -AR     Outcome Summary POD 0 R TKA.  Pt reports normally independent, uses a cane just outside home due to knee pain.  Today pt demonstrates impaired R knee ROm, strength and gait pattern but ableto ambulat w/ contact assist and RW, few cues to improve gait pattern.  No equip needs.  Recommend home w/ home PT.  -AR     Row Name 02/15/22 1349          Therapy Assessment/Plan (PT)    Rehab Potential (PT) good, to achieve stated therapy goals  -AR     Criteria for Skilled Interventions Met (PT) yes  -AR     Row Name 02/15/22 1349          Vital Signs    O2 Delivery Pre Treatment room air  -AR     Row Name 02/15/22 1349          Positioning and Restraints    Pre-Treatment Position in bed  -AR     Post Treatment Position bed  declined chair, wanting to sleep  -AR     In Bed supine; call light within reach; encouraged to call for assist; exit alarm on  -AR           User Key  (r) = Recorded By, (t) = Taken By, (c) = Cosigned By    Initials Name Provider Type    Delia Medrano PT Physical Therapist               Outcome Measures     Row Name 02/15/22 1352          How much help from another person do you currently need...    Turning from your back to your side while in flat bed without using bedrails? 4  -AR     Moving from lying on back to sitting on the side of a flat bed without bedrails? 4  -AR     Moving to and from a bed to a chair (including a wheelchair)? 3  -AR     Standing up from a chair using your arms (e.g., wheelchair, bedside chair)? 3  -AR     Climbing 3-5 steps with a railing? 2  -AR     To walk in hospital room? 3  -AR     AM-PAC 6 Clicks Score (PT) 19  -AR     Row Name 02/15/22 1352          Functional Assessment    Outcome Measure Options AM-PAC 6 Clicks Basic Mobility (PT)  -AR           User Key  (r) = Recorded By, (t) = Taken By, (c) = Cosigned By    Initials Name Provider Type    Delia Medrano PT Physical Therapist                             Physical Therapy Education                  Title: PT OT SLP Therapies (In Progress)     Topic: Physical Therapy (In Progress)     Point: Mobility training (In Progress)     Learning Progress Summary           Patient Acceptance, E, NR by AR at 2/15/2022 1352                   Point: Home exercise program (In Progress)     Learning Progress Summary           Patient Acceptance, E, NR by AR at 2/15/2022 1352                   Point: Body mechanics (In Progress)     Learning Progress Summary           Patient Acceptance, E, NR by AR at 2/15/2022 1352                   Point: Precautions (In Progress)     Learning Progress Summary           Patient Acceptance, E, NR by AR at 2/15/2022 1352                               User Key     Initials Effective Dates Name Provider Type Discipline    AR 06/16/21 -  Delia Rosenberg, PT Physical Therapist PT              PT Recommendation and Plan  Planned Therapy Interventions (PT): balance training, bed mobility training, gait training, home exercise program, patient/family education, transfer training, ROM (range of motion), stair training, strengthening  Plan of Care Reviewed With: patient  Outcome Summary: POD 0 R TKA.  Pt reports normally independent, uses a cane just outside home due to knee pain.  Today pt demonstrates impaired R knee ROm, strength and gait pattern but ableto ambulat w/ contact assist and RW, few cues to improve gait pattern.  No equip needs.  Recommend home w/ home PT.     Time Calculation:    PT Charges     Row Name 02/15/22 1345             Time Calculation    Start Time 1303  -AR      Stop Time 1345  -AR      Time Calculation (min) 42 min  -AR      PT Received On 02/15/22  -AR      PT - Next Appointment 02/16/22  -AR      PT Goal Re-Cert Due Date 02/22/22  -AR            User Key  (r) = Recorded By, (t) = Taken By, (c) = Cosigned By    Initials Name Provider Type    AR Delia Rosenberg PT Physical Therapist              Therapy Charges for Today     Code Description Service Date Service Provider  Modifiers Qty    50715900458 HC PT EVAL LOW COMPLEXITY 4 2/15/2022 Delia Rosenberg, PT GP 1    23602574269 HC PT THER PROC EA 15 MIN 2/15/2022 Delia Rosenberg, PT GP 1          PT G-Codes  Outcome Measure Options: AM-PAC 6 Clicks Basic Mobility (PT)  AM-PAC 6 Clicks Score (PT): 19    Delia Rosenberg PT  2/15/2022

## 2022-02-15 NOTE — ANESTHESIA POSTPROCEDURE EVALUATION
"Patient: Shantell Royal    Procedure Summary     Date: 02/15/22 Room / Location: [facility] OR [address] [place] [address] MAIN OR    Anesthesia Start: 0913 Anesthesia Stop: 1108    Procedure: RIGHT TOTAL KNEE ARTHROPLASTY (Right Knee) Diagnosis:     Surgeons: Tung Sandoval MD Provider: Marilynn Amador MD    Anesthesia Type: general with block ASA Status: 3          Anesthesia Type: general with block    Vitals  Vitals Value Taken Time   /77 02/15/22 1156   Temp     Pulse 88 02/15/22 1209   Resp 14 02/15/22 1155   SpO2 98 % 02/15/22 1209   Vitals shown include unvalidated device data.        Post Anesthesia Care and Evaluation    Patient location during evaluation: PACU  Patient participation: complete - patient participated  Level of consciousness: awake and alert  Pain management: adequate  Airway patency: patent  Anesthetic complications: No anesthetic complications    Cardiovascular status: acceptable  Respiratory status: acceptable  Hydration status: acceptable    Comments: /77   Pulse 75   Temp 36.9 °C (98.4 °F) (Oral)   Resp 14   Ht 167.6 cm (66\")   Wt 103 kg (227 lb 11.8 oz)   SpO2 96%   BMI 36.76 kg/m²         "

## 2022-02-16 ENCOUNTER — HOME HEALTH ADMISSION (OUTPATIENT)
Dept: HOME HEALTH SERVICES | Facility: HOME HEALTHCARE | Age: 57
End: 2022-02-16

## 2022-02-16 ENCOUNTER — TRANSCRIBE ORDERS (OUTPATIENT)
Dept: HOME HEALTH SERVICES | Facility: HOME HEALTHCARE | Age: 57
End: 2022-02-16

## 2022-02-16 VITALS
RESPIRATION RATE: 18 BRPM | TEMPERATURE: 97 F | DIASTOLIC BLOOD PRESSURE: 72 MMHG | OXYGEN SATURATION: 99 % | BODY MASS INDEX: 36.6 KG/M2 | HEIGHT: 66 IN | SYSTOLIC BLOOD PRESSURE: 115 MMHG | HEART RATE: 105 BPM | WEIGHT: 227.74 LBS

## 2022-02-16 DIAGNOSIS — Z96.651 STATUS POST RIGHT KNEE REPLACEMENT: Primary | ICD-10-CM

## 2022-02-16 LAB
ANION GAP SERPL CALCULATED.3IONS-SCNC: 14 MMOL/L (ref 5–15)
BUN SERPL-MCNC: 8 MG/DL (ref 6–20)
BUN/CREAT SERPL: 11.9 (ref 7–25)
CALCIUM SPEC-SCNC: 9.1 MG/DL (ref 8.6–10.5)
CHLORIDE SERPL-SCNC: 99 MMOL/L (ref 98–107)
CO2 SERPL-SCNC: 23 MMOL/L (ref 22–29)
CREAT SERPL-MCNC: 0.67 MG/DL (ref 0.57–1)
GFR SERPL CREATININE-BSD FRML MDRD: 91 ML/MIN/1.73
GLUCOSE BLDC GLUCOMTR-MCNC: 139 MG/DL (ref 70–130)
GLUCOSE BLDC GLUCOMTR-MCNC: 158 MG/DL (ref 70–130)
GLUCOSE SERPL-MCNC: 184 MG/DL (ref 65–99)
HCT VFR BLD AUTO: 30.7 % (ref 34–46.6)
HGB BLD-MCNC: 10 G/DL (ref 12–15.9)
POTASSIUM SERPL-SCNC: 4.3 MMOL/L (ref 3.5–5.2)
SODIUM SERPL-SCNC: 136 MMOL/L (ref 136–145)

## 2022-02-16 PROCEDURE — 63710000001 SENNOSIDES-DOCUSATE 8.6-50 MG TABLET: Performed by: ORTHOPAEDIC SURGERY

## 2022-02-16 PROCEDURE — A9270 NON-COVERED ITEM OR SERVICE: HCPCS | Performed by: ORTHOPAEDIC SURGERY

## 2022-02-16 PROCEDURE — 63710000001 MAGNESIUM OXIDE 400 MG TABLET: Performed by: ORTHOPAEDIC SURGERY

## 2022-02-16 PROCEDURE — 63710000001 METFORMIN ER 500 MG TABLET SUSTAINED-RELEASE 24 HOUR: Performed by: ORTHOPAEDIC SURGERY

## 2022-02-16 PROCEDURE — 63710000001 LEVOTHYROXINE 150 MCG TABLET: Performed by: ORTHOPAEDIC SURGERY

## 2022-02-16 PROCEDURE — 63710000001 PANTOPRAZOLE 40 MG TABLET DELAYED-RELEASE: Performed by: ORTHOPAEDIC SURGERY

## 2022-02-16 PROCEDURE — 63710000001 HYDROCODONE-ACETAMINOPHEN 10-325 MG TABLET: Performed by: ORTHOPAEDIC SURGERY

## 2022-02-16 PROCEDURE — 85014 HEMATOCRIT: CPT | Performed by: ORTHOPAEDIC SURGERY

## 2022-02-16 PROCEDURE — G0378 HOSPITAL OBSERVATION PER HR: HCPCS

## 2022-02-16 PROCEDURE — 0 CEFAZOLIN IN DEXTROSE 2-4 GM/100ML-% SOLUTION: Performed by: ORTHOPAEDIC SURGERY

## 2022-02-16 PROCEDURE — 63710000001 INSULIN LISPRO (HUMAN) PER 5 UNITS: Performed by: ORTHOPAEDIC SURGERY

## 2022-02-16 PROCEDURE — 97116 GAIT TRAINING THERAPY: CPT

## 2022-02-16 PROCEDURE — 63710000001 ASPIRIN 81 MG TABLET DELAYED-RELEASE: Performed by: ORTHOPAEDIC SURGERY

## 2022-02-16 PROCEDURE — 63710000001 CALCIUM CARBONATE (OYSTER SHELL) 500 MG TABLET: Performed by: ORTHOPAEDIC SURGERY

## 2022-02-16 PROCEDURE — 80048 BASIC METABOLIC PNL TOTAL CA: CPT | Performed by: ORTHOPAEDIC SURGERY

## 2022-02-16 PROCEDURE — 63710000001 ATENOLOL 50 MG TABLET: Performed by: ORTHOPAEDIC SURGERY

## 2022-02-16 PROCEDURE — 63710000001 DIAZEPAM 5 MG TABLET: Performed by: ORTHOPAEDIC SURGERY

## 2022-02-16 PROCEDURE — 97530 THERAPEUTIC ACTIVITIES: CPT

## 2022-02-16 PROCEDURE — 82962 GLUCOSE BLOOD TEST: CPT

## 2022-02-16 PROCEDURE — 85018 HEMOGLOBIN: CPT | Performed by: ORTHOPAEDIC SURGERY

## 2022-02-16 RX ORDER — ASPIRIN 81 MG/1
81 TABLET ORAL EVERY 12 HOURS SCHEDULED
Qty: 56 TABLET | Refills: 0 | Status: SHIPPED | OUTPATIENT
Start: 2022-02-16 | End: 2022-03-16

## 2022-02-16 RX ORDER — HYDROCODONE BITARTRATE AND ACETAMINOPHEN 10; 325 MG/1; MG/1
TABLET ORAL
Qty: 56 TABLET | Refills: 0 | Status: SHIPPED | OUTPATIENT
Start: 2022-02-16

## 2022-02-16 RX ADMIN — FLUTICASONE PROPIONATE 1 SPRAY: 50 SPRAY, METERED NASAL at 08:22

## 2022-02-16 RX ADMIN — ATENOLOL 50 MG: 50 TABLET ORAL at 10:55

## 2022-02-16 RX ADMIN — LEVOTHYROXINE SODIUM 150 MCG: 0.15 TABLET ORAL at 08:18

## 2022-02-16 RX ADMIN — DIAZEPAM 5 MG: 5 TABLET ORAL at 13:14

## 2022-02-16 RX ADMIN — MAGNESIUM OXIDE 400 MG (241.3 MG MAGNESIUM) TABLET 400 MG: TABLET at 10:55

## 2022-02-16 RX ADMIN — HYDROCODONE BITARTRATE AND ACETAMINOPHEN 2 TABLET: 10; 325 TABLET ORAL at 10:55

## 2022-02-16 RX ADMIN — HYDROCODONE BITARTRATE AND ACETAMINOPHEN 2 TABLET: 10; 325 TABLET ORAL at 06:36

## 2022-02-16 RX ADMIN — Medication 1000 MG: at 10:55

## 2022-02-16 RX ADMIN — METFORMIN HYDROCHLORIDE 1000 MG: 500 TABLET, EXTENDED RELEASE ORAL at 08:18

## 2022-02-16 RX ADMIN — DOCUSATE SODIUM 50MG AND SENNOSIDES 8.6MG 1 TABLET: 8.6; 5 TABLET, FILM COATED ORAL at 08:18

## 2022-02-16 RX ADMIN — PANTOPRAZOLE SODIUM 40 MG: 40 TABLET, DELAYED RELEASE ORAL at 06:36

## 2022-02-16 RX ADMIN — HYDROCODONE BITARTRATE AND ACETAMINOPHEN 2 TABLET: 10; 325 TABLET ORAL at 02:37

## 2022-02-16 RX ADMIN — INSULIN LISPRO 2 UNITS: 100 INJECTION, SOLUTION INTRAVENOUS; SUBCUTANEOUS at 08:21

## 2022-02-16 RX ADMIN — CEFAZOLIN SODIUM 2 G: 2 INJECTION, SOLUTION INTRAVENOUS at 00:03

## 2022-02-16 RX ADMIN — ASPIRIN 81 MG: 81 TABLET, COATED ORAL at 08:18

## 2022-02-16 NOTE — PROGRESS NOTES
Procedure(s):  RIGHT TOTAL KNEE ARTHROPLASTY     LOS: 0 days     Subjective :   Patient seen and examined.  She is resting comfortably in her hospital bed.  She is easily arousable, alert and oriented, responding appropriately.  She reports that she did have some trouble with pain in the early postoperative period, but this has significantly improved.  Pain is reasonably well managed with her current medications.  Denies any new complaints.  No CP, SoA, calf pain / calf swelling.  Agreeable with plans for discharge to home with home health today if cleared by physical therapy and a.m. labs stable    Objective :    Vital signs in last 24 hours:  Vitals:    02/15/22 1500 02/15/22 1942 02/16/22 0010 02/16/22 0236   BP: 128/72 166/66 127/79 118/68   BP Location: Left arm Left arm Left arm Left arm   Patient Position: Sitting Lying Lying Lying   Pulse: 82 92 94 101   Resp: 18 16 18 20   Temp: 97.3 °F (36.3 °C) 98 °F (36.7 °C) 98.9 °F (37.2 °C) 98.3 °F (36.8 °C)   TempSrc: Oral Oral Oral Oral   SpO2: 97% 100% 100% 98%   Weight:       Height:           PHYSICAL EXAM:  Patient is calm, in no acute distress, awake and oriented x 3.  Dressing is clean, dry and intact.  No signs of infection.  Swelling is appropriate in amount.  Ecchymosis is appropriate in amount.  EHL, FHL, TA, GS intact.  Patient is neurovascularly intact distally.    LABS:  A.m. labs pending          ASSESSMENT:  Status post Procedure(s):  RIGHT TOTAL KNEE ARTHROPLASTY     Plan:  Continue Physical Therapy, increase mobility and range of motion as tolerated.    Continue SCDs, Continue DVT prophylaxis.  Aspirin 81 mg BID for VTE chemoprophylaxis    Dispo planning for home with home health, likely today if meeting physical therapy goals and morning labs stable.    Plan for 2-week postoperative follow-up in Dr. Sandoval's office.      Andriy Gonsales, MARIAJOSE    Date: 2/16/2022  Time: 06:40 EST

## 2022-02-16 NOTE — DISCHARGE PLACEMENT REQUEST
"Riky Xavier (56 y.o. Female)             Date of Birth Social Security Number Address Home Phone MRN    1965  [id number] [address] [facility]  [place] [address] [phone] [id number]    Christianity Marital Status             Scientologist        Admission Date Admission Type Admitting Provider Attending Provider Department, Room/Bed    2/15/22 Elective Tung Sandoval MD [place], [place], MD [address], [address]    Discharge Date Discharge Disposition Discharge Destination           Home or Self Care              Attending Provider: Tung Sandoval MD    Allergies: Eletriptan, Sulfa Antibiotics, Sumatriptan, Zolmitriptan, Caffeine, Cisapride, Methylnaltrexone, Oxycodone-acetaminophen    Isolation: None   Infection: None   Code Status: CPR   Advance Care Planning Activity    Ht: 167.6 cm (66\")   Wt: 103 kg (227 lb 11.8 oz)    Admission Cmt: None   Principal Problem: Status post total knee replacement, right [Z96.651]                 Active Insurance as of 2/15/2022     Primary Coverage     Payor Plan Insurance Group Employer/Plan Group    ANTHEM MEDICARE REPLACEMENT ANTHEM MEDICARE ADVANTAGE [id number]     Payor Plan Address Payor Plan Phone Number Payor Plan Fax Number Effective Dates    [address] [phone]  1/1/2020 - None Entered    [facility] [id number]       Subscriber Name Subscriber Birth Date Member ID       RIKY XAVIER 1965 [id number]                 Emergency Contacts      (Rel.) Home Phone Work Phone Mobile Phone    ROBERT XAVIER (Spouse) [phone] -- --          "

## 2022-02-16 NOTE — PLAN OF CARE
Goal Outcome Evaluation:  Plan of Care Reviewed With: patient        Progress: improving  Outcome Summary: 55 yo female POD 1 RTKA. VSS. NVI. Pt on room air. dressing cdi. axo x4. ambulates stand by assist. pain managed with prn po med and ice. educated pt on safety. plans for d/c home. will continue to monitor

## 2022-02-16 NOTE — THERAPY TREATMENT NOTE
Patient Name: Shantell Royal  : 1965    MRN: [id number]                              Today's Date: 2022       Admit Date: 2/15/2022    Visit Dx:     ICD-10-CM ICD-9-CM   1. Status post total knee replacement, right  Z96.651 V43.65     Patient Active Problem List   Diagnosis   • Status post total knee replacement, right     Past Medical History:   Diagnosis Date   • Anesthesia complication     AFTER C SECTION LOST VISION FOR 24 HOURS   • Anesthesia complication     DIAPHRAGM PARALYSED AFTER SHOULDER SURGERY   • Anxiety    • Arthritis     ALL OVER   • Chronic pain     BACK   • Constipation    • DDD (degenerative disc disease), lumbar    • Diabetes mellitus (HCC)    • Disease of thyroid gland    • Diverticular disease    • GERD (gastroesophageal reflux disease)    • History of COVID-19     2021   • Hyperlipidemia    • Hypertension    • Migraine    • PONV (postoperative nausea and vomiting)    • PPD positive     1 YEAR OF TREATMENT   • Right knee pain    • Seasonal allergies    • Sleep apnea     USES CPAP RARELY   • Tooth ache     PATIENT IS HAVING TOOTH PULLED 2022 SHE HAS NOTIFIED DR SANDOVAL OFFICE.   • Umbilical hernia      Past Surgical History:   Procedure Laterality Date   • APPENDECTOMY     • CARPAL TUNNEL RELEASE Right    •  SECTION     • GALLBLADDER SURGERY     • HERNIA REPAIR     • KNEE ARTHROSCOPY Right    • LUMBAR FUSION     • ROTATOR CUFF REPAIR Right    • SALPINGO OOPHORECTOMY Left    • TONSILLECTOMY     • TOTAL KNEE ARTHROPLASTY Right 2/15/2022    Procedure: RIGHT TOTAL KNEE ARTHROPLASTY;  Surgeon: Tung Sandoval MD;  Location: [facility];  Service: Orthopedics;  Laterality: Right;      General Information     Row Name 22 1256          Physical Therapy Time and Intention    Document Type therapy note (daily note)  -EB     Mode of Treatment physical therapy; individual therapy  -EB     Row Name 22 1256          General Information    Existing  Precautions/Restrictions fall  -EB     Row Name 02/16/22 1256          Cognition    Orientation Status (Cognition) oriented x 4  -EB     Row Name 02/16/22 1256          Safety Issues, Functional Mobility    Impairments Affecting Function (Mobility) endurance/activity tolerance; strength; pain; range of motion (ROM)  -EB           User Key  (r) = Recorded By, (t) = Taken By, (c) = Cosigned By    Initials Name Provider Type     Ev Vivas PTA Physical Therapy Assistant               Mobility     Row Name 02/16/22 1256          Bed Mobility    Supine-Sit [place] (Bed Mobility) standby assist; verbal cues  -EB     Sit-Supine [place] (Bed Mobility) standby assist; verbal cues  -EB     Assistive Device (Bed Mobility) bed rails; head of bed elevated  -EB     Row Name 02/16/22 1256          Sit-Stand Transfer    Sit-Stand [place] (Transfers) contact guard; nonverbal cues (demo/gesture); verbal cues  -EB     Assistive Device (Sit-Stand Transfers) walker, front-wheeled  -EB     Row Name 02/16/22 1256          Gait/Stairs (Locomotion)    [place] Level (Gait) contact guard; nonverbal cues (demo/gesture); verbal cues  -EB     Assistive Device (Gait) walker, front-wheeled  -EB     Distance in Feet (Gait) 60ft  -EB     Deviations/Abnormal Patterns (Gait) matt decreased; gait speed decreased; antalgic  -EB     Bilateral Gait Deviations forward flexed posture  -EB     Right Sided Gait Deviations weight shift ability decreased  -EB     [place] Level (Stairs) contact guard  -EB     Handrail Location (Stairs) both sides  -EB     Number of Steps (Stairs) 4  -EB     Comment (Gait/Stairs) cues for posture correction  -EB           User Key  (r) = Recorded By, (t) = Taken By, (c) = Cosigned By    Initials Name Provider Type    Ev Ruiz PTA Physical Therapy Assistant               Obj/Interventions    No documentation.                Goals/Plan    No documentation.                Clinical  Impression     Row Name 02/16/22 1257          Pain Scale: Numbers Pre/Post-Treatment    Pain Location - Side Right  -EB     Pain Location knee  -EB     Row Name 02/16/22 1257          Plan of Care Review    Plan of Care Reviewed With patient  -EB     Progress improving  -EB     Outcome Summary Pt tolerated treatment with c/o right knee pain. Pt needed SBA with bed mobility and CGA with sit<->stand transfers. Pt ambulated 60ft with rwx, CGA. Pt with slow pace but steady. pt required cues for upright posture. Pt navigated 4 steps with CGA. Pt ok to d/c home with HHPT and assist.  -EB     Row Name 02/16/22 1257          Positioning and Restraints    Pre-Treatment Position in bed  -EB     Post Treatment Position bed  -EB     In Bed supine; encouraged to call for assist; exit alarm on; call light within reach  -EB           User Key  (r) = Recorded By, (t) = Taken By, (c) = Cosigned By    Initials Name Provider Type    Ev Ruiz PTA Physical Therapy Assistant               Outcome Measures     Row Name 02/16/22 1301          How much help from another person do you currently need...    Turning from your back to your side while in flat bed without using bedrails? 4  -EB     Moving from lying on back to sitting on the side of a flat bed without bedrails? 4  -EB     Moving to and from a bed to a chair (including a wheelchair)? 3  -EB     Standing up from a chair using your arms (e.g., wheelchair, bedside chair)? 3  -EB     Climbing 3-5 steps with a railing? 3  -EB     To walk in hospital room? 3  -EB     AM-PAC 6 Clicks Score (PT) 20  -EB           User Key  (r) = Recorded By, (t) = Taken By, (c) = Cosigned By    Initials Name Provider Type    Ev Ruiz PTA Physical Therapy Assistant                             Physical Therapy Education                 Title: PT OT SLP Therapies (Done)     Topic: Physical Therapy (Done)     Point: Mobility training (Done)     Learning Progress Summary           Patient  Acceptance, E,TB,D, VU,DU,NR by EB at 2/16/2022 1301    Acceptance, E, NR by AR at 2/15/2022 1352                   Point: Home exercise program (Done)     Learning Progress Summary           Patient Acceptance, E,TB,D, VU,DU,NR by EB at 2/16/2022 1301    Acceptance, E, NR by AR at 2/15/2022 1352                   Point: Body mechanics (Done)     Learning Progress Summary           Patient Acceptance, E,TB,D, VU,DU,NR by EB at 2/16/2022 1301    Acceptance, E, NR by AR at 2/15/2022 1352                   Point: Precautions (Done)     Learning Progress Summary           Patient Acceptance, E,TB,D, VU,DU,NR by EB at 2/16/2022 1301    Acceptance, E, NR by AR at 2/15/2022 1352                               User Key     Initials Effective Dates Name Provider Type Discipline    AR 06/16/21 -  Delia Rosenberg, PT Physical Therapist PT     06/16/21 -  Ev Vivas PTA Physical Therapy Assistant PT              PT Recommendation and Plan     Plan of Care Reviewed With: patient  Progress: improving  Outcome Summary: Pt tolerated treatment with c/o right knee pain. Pt needed SBA with bed mobility and CGA with sit<->stand transfers. Pt ambulated 60ft with rwx, CGA. Pt with slow pace but steady. pt required cues for upright posture. Pt navigated 4 steps with CGA. Pt ok to d/c home with HHPT and assist.     Time Calculation:    PT Charges     Row Name 02/16/22 1255             Time Calculation    Start Time 1013  -EB      Stop Time 1038  -      Time Calculation (min) 25 min  -      PT Received On 02/16/22  -      PT - Next Appointment 02/17/22  -              Time Calculation- PT    Total Timed Code Minutes- PT 25 minute(s)  -            User Key  (r) = Recorded By, (t) = Taken By, (c) = Cosigned By    Initials Name Provider Type    EB Ev Vivas PTA Physical Therapy Assistant              Therapy Charges for Today     Code Description Service Date Service Provider Modifiers Qty    61452189629 HC GAIT TRAINING  EA 15 MIN 2/16/2022 Ev Vivas, PTA GP 1    [id number] HC PT THERAPEUTIC ACT EA 15 MIN 2/16/2022 Ev Vivas, COLIN GP 1          PT G-Codes  Outcome Measure Options: AM-PAC 6 Clicks Basic Mobility (PT)  AM-PAC 6 Clicks Score (PT): 20    Ev Vivas PTA  2/16/2022

## 2022-02-16 NOTE — DISCHARGE INSTRUCTIONS
Dr. Dr. Bobo Sandoval Total Knee Joint Replacement Discharge Instructions:  Office Phone Number: [phone]    I. ACTIVITIES:  1. Exercises:  ? Complete exercise program as taught by the hospital physical therapist 2 times per day  ? Exercise program will be advanced by the physical therapist  ? During the day be up ambulating every 2 hours (while awake) for short distances  ? Complete the ankle pump exercises at least 10 times per hour (while awake)  ? Elevate legs most of the day the first week post operatively and thereafter elevate legs when in bed and for at least 30 minutes during the day. Caution must be taken to avoid pillow placement under the bend of the knee as this can led to flexion contractures of the knee.  ? Use cold packs 20-30 minutes approximately 5 times per day. This should be done before and after completing your exercises and at any time you are experiencing pain/ stiffness in your operative extremity.      2. Activities of Daily Living:  ? No tub baths, hot tubs, or swimming pools for 4 weeks  ? The clear dressing with thin white gauze strip dressing is waterproof.  You may shower without covering the dressing.  After 7 days you may remove the dressing.  After dressing removal, do not scrub or rub the incision. Allow skin glue to fall off over the next few weeks.  After the dressing is removed, simply let the water run over the incision and pat dry.    II. Restrictions  ? Do not kneel on operative leg.  ? Dr. Sandoval will discuss with you when you will be able to drive again.  ? Weight bearing is as tolerated, and range of motion as tolerated.  ? First week stay inside on even terrain. May go up and down stairs one stair at a time utilizing the hand rail.  ? Once you feel confident, you may venture outside.    III. Precautions:  ? Everyone that comes near you should wash their hands  ? Avoid if possible dental work or other elective surgeries within 12 weeks of your surgical  procedure.  ?  If dental work or surgical procedure is deemed absolutely necessary within 12 weeks of surgery, you will need to contact Dr. Sandoval office as you will need to take antibiotics 1 hour prior to any dental work (including teeth cleanings).  ? Dr. Sandoval will prescribe prophylactic antibiotics for all dental procedures for one year  as a precautionary measure to minimize risk of infection.  If you are a diabetic or take immunosuppressive medication, you may have to take prophylactic antibiotics the remainder of your life before dental work.    ? Avoid sick people. If you must be around someone who is ill, they should wear a mask.  ? Avoid visits to the Emergency Room or Urgent Care unless you are having a life threatening event.   ? Dr. Sandoval does not routinely place on stockings, but if you are having swelling in your feet or ankle then you may obtain stockings from your local pharmacy or [facility].   Stockings are to be placed on in the morning and removed at night. Monitor the stockings to ensure that any swelling is not causing the stockings to become too tight. In this case, remove stockings immediately.    IV. INCISION CARE:   Dr. Bobo Sandoval takes great care in closing your incision to give you the best opportunity for a healthy incision with minimal scarring. He places sutures below the skin surface that will eventually dissolve.  The incision is then covered with a skin glue which makes the incision water tight, and minimizes bleeding onto the dressing.  No staples are used.  Occasionally one of the buried stitches may come to the skin surface and may need to be removed.  Please resist the temptation of removing the stitch by yourself.  Dr. Sandoval will be happy to remove it for you.  Bruising around the knee and back of thigh is normal and to be expected.  You may also have pain and or bruising in the thigh where a tourniquet was used.    ? Please keep dressing in place at least  until post-op day 7. You may remove and replace dressing before day 7 if the dressing begins to fall off or becomes saturated. Wash your hands and under your finger nails prior to dressing changes.  After day 7 as long as incision is dry and intact, you may leave the dressing off and open to air.    ? If dressing must be changed, utilize dry gauze and paper tape. Avoid touching the side of the gauze that goes against the incision with your hands.  ? No creams or ointments to the incision until permission given by Dr. Sandoval.  ? Do not touch or pick at the incision, or try to remove any sutures or skin glue.  ? Check dressing every day and notify surgeon immediately if any of the following signs or symptoms are noted:  o Increase in redness  o Increase in swelling of the entire extremity that does not go away with elevation.  Notify office that you may have a blood clot.    o Drainage oozing from the incision  o Pulling apart of the edges of the incision  o Increase in overall body temperature (greater than 100.5 degrees)    V. Medications:   1. Anticoagulants: You will be discharged on an anticoagulant. This is a prophylactic medication that helps prevent blood clots during your post-operative period. The type and length of dosage varies based on your individual needs, procedure performed, and Dr. Sandoval's preference.  ? While taking the anticoagulant, you should avoid taking any additional aspirin than what is prescribed.   ? Notify surgeon immediately if any kinga bleeding is noted in the urine, stool, emesis, or from the nose or the incision. Blood in the stool will often appear as black rather than red. Blood in urine may appear as pink. Blood in emesis may appear as brown/black like coffee grounds.  ? You will need to apply pressure for longer periods of time to any cuts or abrasions to stop bleeding  ? Avoid alcohol while taking anticoagulants.    2. Stool Softeners: You will be at greater risk of constipation  after surgery due to being less mobile and the pain medications.   ? Take stool softeners as instructed by your surgeon while on pain medications. Over the counter Colace 100 mg 1-2 capsules twice daily.   ? If stools become too loose or too frequent, please decreases the dosage or stop the stool softener.  ? If constipation occurs despite use of stool softeners, you are to continue the stool softeners and add a laxative (Milk of Magnesia 1 ounce daily as needed).  If no bowel movement occurs past 3 days, then purchase Magnesium citrate and drink 1/2 bottle every 8 hours (on ice tastes better) until success. If no bowel movement by post-operative day 5,  please call Dr. Sandoval's office for further instructions.   You may need to decrease or stop your pain medications if bowel movements to not occur.     ? Drink plenty of fluids, and eat fruits and vegetables during your recovery time.    3. Pain Medications utilized after surgery are narcotics and the law requires that the following information be given to all patients that are prescribed narcotics:  ? CLASSIFICATION: Pain medications are called Opioids and are narcotics  ? LEGALITIES: It is illegal to share narcotics with others and to drive within 24 hours of taking narcotics  ? POTENTIAL SIDE EFFECTS: Potential side effects of opioids include: nausea, vomiting, itching, dizziness, drowsiness, dry mouth, constipation, and difficulty urinating.  ? POTENTIAL ADVERSE EFFECTS:   o Opioid tolerance can develop with use of pain medications and this simply means that it requires more and more of the medication to control pain; however, this is seen more in patients that use opioids for longer periods of time.  o Opioid dependence can develop with use of Opioids and this simply means that to stop the medication can cause withdrawal symptoms; however, this is seen with patients that use Opioids for longer periods of time.  o Opioid addiction can develop with use of Opioids  "and the incidence of this is very unlikely in patients who take the medications as ordered and stop the medications as instructed.  o Opioid overdose can be dangerous, but is unlikely when the medication is taken as ordered and stopped when ordered. It is important not to mix opioids with alcohol or with and type of sedative such as Benadryl as this can lead to over sedation and respiratory difficulty.  ? DOSAGE:   o Pain medications will need to be taken consistently for the first few days to decrease pain and promote adequate pain relief and participation in physical therapy.  o After the initial surgical pain begins to resolve, you may begin to decrease the pain medication. By the end of 6 weeks, you should be off of pain medications except for before physical therapy or to help with pain when attempting to fall asleep.  Pain medications will be tapered to lesser dosages as you are further from your surgical day.  No pain medications will be provided after 3 months from surgery.     o Refills will not be given by the office during evening hours, or weekends.  o To seek refills on pain medications during the post-operative period, you must call the office 48 hours in advance to request the refill. The office will then notify you when to  the prescription. DO NOT wait until you are out of the medication to request a refill.  They can not be \"called in\" to the pharmacy.      V. FOLLOW-UP VISITS:  ? You will need to follow up in the office with Dr. Sandoval in 10-14 days.  Please call [phone] if you need to confirm or reschedule your appointment time.   ? If you have any concerns or suspected complications prior to your follow up visit, please call your surgeons office. Do not wait until your appointment time if you suspect complications. These will need to be addressed in the office promptly.  "

## 2022-02-16 NOTE — PROGRESS NOTES
Continued Stay Note  [facility]     Patient Name: Shantell Royal  MRN: [id number]  Today's Date: 2/16/2022    Admit Date: 2/15/2022     Discharge Plan     Row Name 02/16/22 1241       Plan    Plan [facility]    Patient/Family in Agreement with Plan yes    Plan Comments Spoke with pt, verified correct information on facesheet and explained the role of CCP. Pt would like to d/c home with [facility], referral sent in Epic to [facility]. Plan will be to d/c home with [facility] and family support. No other needs identified.    Final Discharge Disposition Code 06 - home with home health care    Final Note Pt to d/c home with [facility]               Discharge Codes    No documentation.               Expected Discharge Date and Time     Expected Discharge Date Expected Discharge Time    Feb 16, 2022             Jazmine Campos RN

## 2022-02-16 NOTE — PLAN OF CARE
Goal Outcome Evaluation:           Progress: improving  Outcome Summary: 57 y/o s/POD 1 R TKA. Pt up w/ stand by assist. Voiding function intact per toilet. Neuro checks WNL, no c/o numb/ting. Optifoam dsg c/d/i. Pain managed via PO pills. PIV removed. Glucose monitored and treated w/ sss insulin. VSS. Running tachycardic. RA. Educated on d/c instructions. Plan to d/c home w/ family.

## 2022-02-16 NOTE — PROGRESS NOTES
[facility] given referral and will follow for home health PT.  Spoke with patient at bedside and verified all info.  Her cell phone [phone] is best to reach and needs added into epic demographics.    Patient states afternoon visits after 2PM are best for her if possible but is understanding if that is not possible.  [facility] has cats and dogs in home for any staff allergy prone.  D/C planned for today  Order will be transcribed for Dr Sandoval.  Radha Gomez RN

## 2022-02-16 NOTE — DISCHARGE SUMMARY
Discharge Summary    Date of Admission: 2/15/2022  6:45 AM  Date of Discharge:  2/16/2022    Discharge Diagnosis:   Status post total knee replacement, right [Z96.651]    PMHX:   Past Medical History:   Diagnosis Date   • Anesthesia complication     AFTER C SECTION LOST VISION FOR 24 HOURS   • Anesthesia complication     DIAPHRAGM PARALYSED AFTER SHOULDER SURGERY   • Anxiety    • Arthritis     ALL OVER   • Chronic pain     BACK   • Constipation    • DDD (degenerative disc disease), lumbar    • Diabetes mellitus (HCC)    • Disease of thyroid gland    • Diverticular disease    • GERD (gastroesophageal reflux disease)    • History of COVID-19     2/5/2021   • Hyperlipidemia    • Hypertension    • Migraine    • PONV (postoperative nausea and vomiting)    • PPD positive     1 YEAR OF TREATMENT   • Right knee pain    • Seasonal allergies    • Sleep apnea     USES CPAP RARELY   • Tooth ache     PATIENT IS HAVING TOOTH PULLED 2/4/2022 SHE HAS NOTIFIED DR SALTER OFFICE.   • Umbilical hernia        Discharge Disposition  Home or Self Care    Procedures Performed  Procedure(s):  RIGHT TOTAL KNEE ARTHROPLASTY       Indication for Admission  Patient is a 56 y.o. female admitted after undergoing the above surgical procedure. They were admitted for post-operative pain control, medical management and physical therapy.  They progressed with physical therapy.  They were deemed stable for discharge.      Consults:   Consults     No orders found for last 30 day(s).          Discharge Instructions:  Patient is weight bearing as tolerated on the operative leg.  Patient is to progress range of motion and ambulation as tolerated.  Use walker as needed for stability and gait.  May progress to cane as tolerated.  The dressing should be left on knee until post-operative day 7, and then removed.  Dressing is waterproof. Patient may shower.  Use ace wrap as needed for swelling.  Patient will follow-up in the office in 2 weeks.  Call the  office at [phone] for any questions or concerns.      Discharge Medications     Discharge Medications      New Medications      Instructions Start Date   aspirin 81 MG EC tablet   81 mg, Oral, Every 12 Hours Scheduled      HYDROcodone-acetaminophen  MG per tablet  Commonly known as: NORCO  Replaces: HYDROcodone-acetaminophen 7.5-325 MG per tablet   Take 1 to 2 tablets as needed for pain every 4 hours --maximum 8 tablets/day         Continue These Medications      Instructions Start Date   amitriptyline 25 MG tablet  Commonly known as: ELAVIL   25 mg, Oral, Nightly      atenolol 50 MG tablet  Commonly known as: TENORMIN   50 mg, Oral, Daily      CALCIUM 1200+D3 PO   Oral, HOLD PRIOR TO SURG      cholecalciferol 25 MCG (1000 UT) tablet  Commonly known as: VITAMIN D3   1,000 Units, Oral, Daily      cyclobenzaprine 10 MG tablet  Commonly known as: FLEXERIL   10 mg, Oral, 2 Times Daily      diclofenac 75 MG EC tablet  Commonly known as: VOLTAREN   75 mg, Oral, 2 Times Daily, INSTRUCTED PT TO FOLLOW MD INSTRUCTIONS REGARDING HOLDING FOR SURGERY      ESTROVEN MENOPAUSE RELIEF PO   1 tablet, Oral, Nightly      Flaxseed Oil 1000 MG capsule   1,000 mg, Oral, Daily      Hair/Skin/Nails capsule   3 capsules, Oral, Daily      L-Lysine 1000 MG tablet   1 tablet, Oral, As Needed      levothyroxine 150 MCG tablet  Commonly known as: SYNTHROID, LEVOTHROID   150 mcg, Oral, Daily      magnesium oxide 400 MG tablet  Commonly known as: MAG-OX   500 mg, Oral, Daily      melatonin 3 MG tablet   3 mg, Oral, Nightly      metFORMIN  MG 24 hr tablet  Commonly known as: GLUCOPHAGE-XR   2 tablets, Oral, 2 Times Daily      mometasone 50 MCG/ACT nasal spray  Commonly known as: NASONEX   2 sprays, Nasal, As Needed      montelukast 10 MG tablet  Commonly known as: SINGULAIR   1 tablet, Oral, Nightly      omeprazole 40 MG capsule  Commonly known as: priLOSEC   40 mg, Oral, Daily      pravastatin 80 MG tablet  Commonly known as:  PRAVACHOL   80 mg, Oral, Nightly      promethazine 25 MG tablet  Commonly known as: PHENERGAN   25 mg, Oral, Every 6 Hours PRN      psyllium 0.52 g capsule  Commonly known as: METAMUCIL   0.52 g, Oral, Daily      rizatriptan MLT 10 MG disintegrating tablet  Commonly known as: MAXALT-MLT   10 mg, Translingual, Once As Needed, May repeat in 2 hours if needed      sennosides-docusate 8.6-50 MG per tablet  Commonly known as: PERICOLACE   2 tablets, Oral, 2 Times Daily PRN      VITAMIN C PO   1,000 mg, Oral, Daily, HOLD PRIOR TO SURG      ZINC PO   1 tablet, Oral, Daily, HOLD PRIOR TO SURG      zolpidem 10 MG tablet  Commonly known as: AMBIEN   10 mg, Oral, Nightly         Stop These Medications    HYDROcodone-acetaminophen 7.5-325 MG per tablet  Commonly known as: NORCO  Replaced by: HYDROcodone-acetaminophen  MG per tablet            Discharge Diet:     Activity at Discharge:   Activity Instructions     Patient May Shower; No Tub Baths, Pools or Hot Tubs      Range of Motion      Weight Bearing As Tolerated            Follow-up Appointments  No future appointments.  Additional Instructions for the Follow-ups that You Need to Schedule     Apply Ice to Affected Knee Every 2 Hours   As directed      Remove Dressing   As directed      Number of Days: 7                  Tung Sandoval MD  02/16/22,  10:13 EST

## 2022-02-16 NOTE — PLAN OF CARE
Goal Outcome Evaluation:  Plan of Care Reviewed With: patient        Progress: improving  Outcome Summary: Pt tolerated treatment with c/o right knee pain. Pt needed SBA with bed mobility and CGA with sit<->stand transfers. Pt ambulated 60ft with rwx, CGA. Pt with slow pace but steady. pt required cues for upright posture. Pt navigated 4 steps with CGA. Pt ok to d/c home with HHPT and assist.    ..Patient was wearing a face mask during this therapy encounter. Therapist used appropriate personal protective equipment including eye protection, mask, and gloves.  Mask used was standard procedure mask. Appropriate PPE was worn during the entire therapy session. Hand hygiene was completed before and after therapy session. Patient is not in enhanced droplet precautions.

## 2022-02-17 ENCOUNTER — HOME CARE VISIT (OUTPATIENT)
Dept: HOME HEALTH SERVICES | Facility: HOME HEALTHCARE | Age: 57
End: 2022-02-17

## 2022-02-17 VITALS
SYSTOLIC BLOOD PRESSURE: 102 MMHG | HEART RATE: 106 BPM | OXYGEN SATURATION: 96 % | TEMPERATURE: 95.5 F | RESPIRATION RATE: 18 BRPM | DIASTOLIC BLOOD PRESSURE: 44 MMHG

## 2022-02-17 PROCEDURE — G0151 HHCP-SERV OF PT,EA 15 MIN: HCPCS

## 2022-02-17 NOTE — CASE COMMUNICATION
SOC OASIS completed today. Plan for 1wk1, 3wk1, 1wk1. Pt is s/p R TKA on 2/15/2022.   R KNEE ROM  degrees.  She is amb with the walker on level surfaces ~ 50ft.   She lives with her  and her mom is visiting to assist PRN.  She plans to go to OP PT at [place].

## 2022-02-17 NOTE — HOME HEALTH
Pt is s/p R TKA by Dr Sandoval on 2/15/2022.   PMHX: DM type 2, hypothyroidism, per pt hx of COVID 19 which led to tachycardia.     R knee dressing intact, no visible drainage, min edema present.     PLOF: independent     Plan for next visit: review pain/edema management, gait with rolling walker, review and progress HEP, progress R knee ROM beyond  degrees.

## 2022-02-21 ENCOUNTER — HOME CARE VISIT (OUTPATIENT)
Dept: HOME HEALTH SERVICES | Facility: HOME HEALTHCARE | Age: 57
End: 2022-02-21

## 2022-02-21 VITALS
RESPIRATION RATE: 16 BRPM | HEART RATE: 97 BPM | DIASTOLIC BLOOD PRESSURE: 80 MMHG | SYSTOLIC BLOOD PRESSURE: 130 MMHG | OXYGEN SATURATION: 98 %

## 2022-02-21 PROCEDURE — G0151 HHCP-SERV OF PT,EA 15 MIN: HCPCS

## 2022-02-21 NOTE — HOME HEALTH
"\"I'm ok; bowels moving\"    Pt reports she removed the dressing because it was itching.  Advised her to avoid itching if possible to prevent infection.  She verbalized understanding.     Plan for next visit: progress to cane and standing exercises as tolerated, progress R knee beyond  degrees."

## 2022-02-23 ENCOUNTER — HOME CARE VISIT (OUTPATIENT)
Dept: HOME HEALTH SERVICES | Facility: HOME HEALTHCARE | Age: 57
End: 2022-02-23

## 2022-02-23 NOTE — CASE COMMUNICATION
Advised patient and her  to apply right leg TOMI hose tomorrow after removing knee immoblizer ace wrap, but advised to LEAVE surgical dressing in place. Left TOMI hose applied here. Handoff to [facility], RN, phase two level of care.  PT in progress Patient missed a PT visit from [facility] on 2/23/2022     Reason: she called at 12:02 PM to cancel today's 1PM visit due to increase in pain and not up for therapy today.       For your records only.   As per home health protocol, MD must be notified of missed/cancelled visits; therefore the prescribed frequency was not met.  Mirvaso Counseling: Mirvaso is a topical medication which can decrease superficial blood flow where applied. Side effects are uncommon and include stinging, redness and allergic reactions.

## 2022-02-25 ENCOUNTER — HOME CARE VISIT (OUTPATIENT)
Dept: HOME HEALTH SERVICES | Facility: HOME HEALTHCARE | Age: 57
End: 2022-02-25

## 2022-02-25 PROCEDURE — G0157 HHC PT ASSISTANT EA 15: HCPCS

## 2022-02-25 NOTE — HOME HEALTH
SUBJECTIVE: Patient has no questions and reports no new concerns since last visit. Slept through her alarm, not up and ready for therapy upon arrival. Has not set up an OP eval yet but states she will  later today.     EDEMA: Moderate non-pitting. Calf is soft & not tender. Marcial's negative.     WOUNDS: Small bandage on the anterior portion the the incision, clean and dry.    SIGNS/SYMPTOMS OF INFECTION: No    FALLS SINCE LAST VISIT: No

## 2022-03-02 ENCOUNTER — HOME CARE VISIT (OUTPATIENT)
Dept: HOME HEALTH SERVICES | Facility: HOME HEALTHCARE | Age: 57
End: 2022-03-02

## 2022-03-02 VITALS
RESPIRATION RATE: 16 BRPM | SYSTOLIC BLOOD PRESSURE: 120 MMHG | HEART RATE: 89 BPM | OXYGEN SATURATION: 97 % | DIASTOLIC BLOOD PRESSURE: 64 MMHG

## 2022-03-02 PROCEDURE — G0151 HHCP-SERV OF PT,EA 15 MIN: HCPCS

## 2022-03-02 NOTE — HOME HEALTH
MD flowers was fine yesterday. I saw PA and he said everything looks good.  I am ready to walk with the cane outside.   Agreeable to PT DC.  Starting OP PT next week at [facility].

## 2023-09-20 ENCOUNTER — PRE-ADMISSION TESTING (OUTPATIENT)
Dept: PREADMISSION TESTING | Facility: HOSPITAL | Age: 58
End: 2023-09-20
Payer: MEDICARE

## 2023-09-20 ENCOUNTER — HOSPITAL ENCOUNTER (OUTPATIENT)
Dept: GENERAL RADIOLOGY | Facility: HOSPITAL | Age: 58
Discharge: HOME | End: 2023-09-20
Payer: MEDICARE

## 2023-09-20 VITALS
TEMPERATURE: 97.8 F | DIASTOLIC BLOOD PRESSURE: 79 MMHG | SYSTOLIC BLOOD PRESSURE: 134 MMHG | BODY MASS INDEX: 37.85 KG/M2 | HEART RATE: 82 BPM | WEIGHT: 221.7 LBS | RESPIRATION RATE: 16 BRPM | OXYGEN SATURATION: 100 % | HEIGHT: 64 IN

## 2023-09-20 LAB
ALBUMIN SERPL-MCNC: 4.5 G/DL (ref 3.5–5.2)
ALBUMIN/GLOB SERPL: 2.1 G/DL
ALP SERPL-CCNC: 99 U/L (ref 39–117)
ALT SERPL W P-5'-P-CCNC: 21 U/L (ref 1–33)
ANION GAP SERPL CALCULATED.3IONS-SCNC: 12 MMOL/L (ref 5–15)
AST SERPL-CCNC: 21 U/L (ref 1–32)
BILIRUB SERPL-MCNC: 0.4 MG/DL (ref 0–1.2)
BILIRUB UR QL STRIP: NEGATIVE
BUN SERPL-MCNC: 11 MG/DL (ref 6–20)
BUN/CREAT SERPL: 16.2 (ref 7–25)
CALCIUM SPEC-SCNC: 9.6 MG/DL (ref 8.6–10.5)
CHLORIDE SERPL-SCNC: 104 MMOL/L (ref 98–107)
CLARITY UR: CLEAR
CO2 SERPL-SCNC: 24 MMOL/L (ref 22–29)
COLOR UR: YELLOW
CREAT SERPL-MCNC: 0.68 MG/DL (ref 0.57–1)
DEPRECATED RDW RBC AUTO: 43.3 FL (ref 37–54)
EGFRCR SERPLBLD CKD-EPI 2021: 100.5 ML/MIN/1.73
ERYTHROCYTE [DISTWIDTH] IN BLOOD BY AUTOMATED COUNT: 14.3 % (ref 12.3–15.4)
GLOBULIN UR ELPH-MCNC: 2.1 GM/DL
GLUCOSE SERPL-MCNC: 140 MG/DL (ref 65–99)
GLUCOSE UR STRIP-MCNC: NEGATIVE MG/DL
HBA1C MFR BLD: 7.3 % (ref 4.8–5.6)
HCT VFR BLD AUTO: 37.5 % (ref 34–46.6)
HGB BLD-MCNC: 12.3 G/DL (ref 12–15.9)
HGB UR QL STRIP.AUTO: NEGATIVE
HOLD SPECIMEN: NORMAL
INR PPP: 1 (ref 0.9–1.1)
KETONES UR QL STRIP: NEGATIVE
LEUKOCYTE ESTERASE UR QL STRIP.AUTO: NEGATIVE
MCH RBC QN AUTO: 27.6 PG (ref 26.6–33)
MCHC RBC AUTO-ENTMCNC: 32.8 G/DL (ref 31.5–35.7)
MCV RBC AUTO: 84.3 FL (ref 79–97)
NITRITE UR QL STRIP: NEGATIVE
PH UR STRIP.AUTO: 6.5 [PH] (ref 5–8)
PLATELET # BLD AUTO: 254 10*3/MM3 (ref 140–450)
PMV BLD AUTO: 11.5 FL (ref 6–12)
POTASSIUM SERPL-SCNC: 4.9 MMOL/L (ref 3.5–5.2)
PROT SERPL-MCNC: 6.6 G/DL (ref 6–8.5)
PROT UR QL STRIP: NEGATIVE
PROTHROMBIN TIME: 13.3 SECONDS (ref 11.7–14.2)
QT INTERVAL: 350 MS
QTC INTERVAL: 399 MS
RBC # BLD AUTO: 4.45 10*6/MM3 (ref 3.77–5.28)
SODIUM SERPL-SCNC: 140 MMOL/L (ref 136–145)
SP GR UR STRIP: 1.01 (ref 1–1.03)
UROBILINOGEN UR QL STRIP: NORMAL
WBC NRBC COR # BLD: 6.35 10*3/MM3 (ref 3.4–10.8)

## 2023-09-20 PROCEDURE — 93010 ELECTROCARDIOGRAM REPORT: CPT | Performed by: INTERNAL MEDICINE

## 2023-09-20 PROCEDURE — 93005 ELECTROCARDIOGRAM TRACING: CPT

## 2023-09-20 PROCEDURE — 80053 COMPREHEN METABOLIC PANEL: CPT

## 2023-09-20 PROCEDURE — 85027 COMPLETE CBC AUTOMATED: CPT

## 2023-09-20 PROCEDURE — 85610 PROTHROMBIN TIME: CPT

## 2023-09-20 PROCEDURE — 71046 X-RAY EXAM CHEST 2 VIEWS: CPT

## 2023-09-20 PROCEDURE — 81003 URINALYSIS AUTO W/O SCOPE: CPT

## 2023-09-20 PROCEDURE — 83036 HEMOGLOBIN GLYCOSYLATED A1C: CPT

## 2023-09-20 PROCEDURE — 36415 COLL VENOUS BLD VENIPUNCTURE: CPT

## 2023-09-20 RX ORDER — DOXYCYCLINE HYCLATE 100 MG
100 TABLET ORAL 2 TIMES DAILY
COMMUNITY
Start: 2023-09-19 | End: 2023-09-29

## 2023-09-20 RX ORDER — CHLORHEXIDINE GLUCONATE 500 MG/1
CLOTH TOPICAL
COMMUNITY
End: 2023-09-20

## 2023-09-20 RX ORDER — METHYLPREDNISOLONE 4 MG/1
TABLET ORAL
COMMUNITY
Start: 2023-09-20

## 2023-09-20 RX ORDER — FUROSEMIDE 20 MG/1
20 TABLET ORAL AS NEEDED
COMMUNITY
Start: 2022-12-29 | End: 2024-07-20

## 2023-09-20 RX ORDER — ATENOLOL 50 MG/1
50 TABLET ORAL EVERY EVENING
COMMUNITY
Start: 2023-04-24

## 2023-09-20 RX ORDER — HYDROCODONE BITARTRATE AND ACETAMINOPHEN 7.5; 325 MG/1; MG/1
1 TABLET ORAL EVERY 6 HOURS PRN
COMMUNITY

## 2023-09-20 NOTE — DISCHARGE INSTRUCTIONS
Take the following medications the morning of surgery:  LEVOTHYROXINE AND OMEPRAZOLE, PAIN PILLS AS NEEDED    ARRIVAL TIME 0600 ON 10/5/23      If you are on prescription narcotic pain medication to control your pain you may also take that medication the morning of surgery.    General Instructions:  Do not eat solid food after midnight the night before surgery.  You may drink clear liquids day of surgery but must stop at least one hour before your hospital arrival time.  It is beneficial for you to have a clear drink that contains carbohydrates the day of surgery.  We suggest a 12 to 20 ounce bottle of Gatorade or Powerade for non-diabetic patients or a 12 to 20 ounce bottle of G2 or Powerade Zero for diabetic patients. (Pediatric patients, are not advised to drink a 12 to 20 ounce carbohydrate drink)    Clear liquids are liquids you can see through.  Nothing red in color.     Plain water                               Sports drinks  Sodas                                   Gelatin (Jell-O)  Fruit juices without pulp such as white grape juice and apple juice  Popsicles that contain no fruit or yogurt  Tea or coffee (no cream or milk added)  Gatorade / Powerade  G2 / Powerade Zero    Infants may have breast milk up to four hours before surgery.  Infants drinking formula may drink formula up to six hours before surgery.   Patients who avoid smoking, chewing tobacco and alcohol for 4 weeks prior to surgery have a reduced risk of post-operative complications.  Quit smoking as many days before surgery as you can.  Do not smoke, use chewing tobacco or drink alcohol the day of surgery.   If applicable bring your C-PAP/ BI-PAP machine in with you to preop day of surgery.  Bring any papers given to you in the doctor’s office.  Wear clean comfortable clothes.  Do not wear contact lenses, false eyelashes or make-up.  Bring a case for your glasses.   Bring crutches or walker if applicable.  Remove all piercings.  Leave jewelry  and any other valuables at home.  Hair extensions with metal clips must be removed prior to surgery.  The Pre-Admission Testing nurse will instruct you to bring medications if unable to obtain an accurate list in Pre-Admission Testing.            Preventing a Surgical Site Infection:  For 2 to 3 days before surgery, avoid shaving with a razor because the razor can irritate skin and make it easier to develop an infection.    Any areas of open skin can increase the risk of a post-operative wound infection by allowing bacteria to enter and travel throughout the body.  Notify your surgeon if you have any skin wounds / rashes even if it is not near the expected surgical site.  The area will need assessed to determine if surgery should be delayed until it is healed.  The night prior to surgery shower using a fresh bar of anti-bacterial soap (such as Dial) and clean washcloth.  Sleep in a clean bed with clean clothing.  Do not allow pets to sleep with you.  Shower on the morning of surgery using a fresh bar of anti-bacterial soap (such as Dial) and clean washcloth.  Dry with a clean towel and dress in clean clothing.  Ask your surgeon if you will be receiving antibiotics prior to surgery.  Make sure you, your family, and all healthcare providers clean their hands with soap and water or an alcohol based hand  before caring for you or your wound.    Day of surgery:  Your arrival time is approximately two hours before your scheduled surgery time.  Upon arrival, a Pre-op nurse and Anesthesiologist will review your health history, obtain vital signs, and answer questions you may have.  The only belongings needed at this time will be a list of your home medications and if applicable your C-PAP/BI-PAP machine.  A Pre-op nurse will start an IV and you may receive medication in preparation for surgery, including something to help you relax.     Please be aware that surgery does come with discomfort.  We want to make every  effort to control your discomfort so please discuss any uncontrolled symptoms with your nurse.   Your doctor will most likely have prescribed pain medications.      If you are going home after surgery you will receive individualized written care instructions before being discharged.  A responsible adult must drive you to and from the hospital on the day of your surgery and stay with you for 24 hours.  Discharge prescriptions can be filled by the hospital pharmacy during regular pharmacy hours.  If you are having surgery late in the day/evening your prescription may be e-prescribed to your pharmacy.  Please verify your pharmacy hours or chose a 24 hour pharmacy to avoid not having access to your prescription because your pharmacy has closed for the day.    If you are staying overnight following surgery, you will be transported to your hospital room following the recovery period.  [facility] has all private rooms.    If you have any questions please call Pre-Admission Testing at [phone].  Deductibles and co-payments are collected on the day of service. Please be prepared to pay the required co-pay, deductible or deposit on the day of service as defined by your plan.    Call your surgeon immediately if you experience any of the following symptoms:  Sore Throat  Shortness of Breath or difficulty breathing  Cough  Chills  Body soreness or muscle pain  Headache  Fever  New loss of taste or smell  Do not arrive for your surgery ill.  Your procedure will need to be rescheduled to another time.  You will need to call your physician before the day of surgery to avoid any unnecessary exposure to hospital staff as well as other patients.

## 2023-09-26 ENCOUNTER — TELEPHONE (OUTPATIENT)
Dept: ORTHOPEDIC SURGERY | Facility: HOSPITAL | Age: 58
End: 2023-09-26
Payer: MEDICARE

## 2023-09-26 NOTE — TELEPHONE ENCOUNTER
Risk Factor yes no   Age >75  X   BMI <20 >40  X   Patient History     Chronic Pain (2 or more meds/Pain Management)  X   ETOH (more than 3 drinks Daily)  X   Uncontrolled Depression/Bipolar/Schizoaffective Disorder  X   Arrhythmias--  X   Stent placement/MI  X   DVT/PE  X   Pacemaker  X   HTN (uncontrolled or requiring more than 2 medications)  X   CHF/Retained fluids/Edema  X   Stroke with Residual   X   COPD/Asthma  X   BEBE--Non-compliant with CPAP X    Diabetes (on insulin or more than 2 meds)         A1C: 7.3  X   BPH/Urinary retention (on medication)     CKD  X   Home environment and support     Current ambulation status (use of cane, walker, W/C, Multiple falls/weakness)  X   Stairs to enter and throughout home X    Lives Alone  X   Doesn't have support at home  X   Outpatient Screening Assessment    Home needs: (Walker/BSC):  Has walker  ? Steps 2 steps   Caregiver 24-48hrs post-discharge:      Discharge Plan: Mariia ACOSTA       Prescriptions: Meds to bed    Home medications:   [] Blood thinner/anti-coag therapy--   ? BPH or diuretic--Lasix  ? BP meds-- Atenolol  ? Pain/Anti-inflammatories-- Norco  Pre-op Education:  Educate patient on spinal anesthesia/pain control:  ? patient verbalize understanding    Educate patient on hospital course/timeline:  ?  patient verbalize understanding    Joint Care Class:  ?  yes [] no  Notes:   DM-- Metformin

## 2023-10-05 ENCOUNTER — ANESTHESIA (OUTPATIENT)
Dept: PERIOP | Facility: HOSPITAL | Age: 58
End: 2023-10-05
Payer: MEDICARE

## 2023-10-05 ENCOUNTER — APPOINTMENT (OUTPATIENT)
Dept: GENERAL RADIOLOGY | Facility: HOSPITAL | Age: 58
End: 2023-10-05
Payer: MEDICARE

## 2023-10-05 ENCOUNTER — ANESTHESIA EVENT (OUTPATIENT)
Dept: PERIOP | Facility: HOSPITAL | Age: 58
End: 2023-10-05
Payer: MEDICARE

## 2023-10-05 ENCOUNTER — HOSPITAL ENCOUNTER (OUTPATIENT)
Facility: HOSPITAL | Age: 58
Setting detail: OBSERVATION
Discharge: HOME | End: 2023-10-06
Attending: ORTHOPAEDIC SURGERY | Admitting: ORTHOPAEDIC SURGERY
Payer: MEDICARE

## 2023-10-05 DIAGNOSIS — Z96.651 STATUS POST TOTAL KNEE REPLACEMENT, RIGHT: Primary | ICD-10-CM

## 2023-10-05 PROBLEM — Z96.652 STATUS POST TOTAL KNEE REPLACEMENT, LEFT: Status: ACTIVE | Noted: 2023-10-05

## 2023-10-05 PROBLEM — Z96.649 HISTORY OF TOTAL HIP ARTHROPLASTY: Status: ACTIVE | Noted: 2023-10-05

## 2023-10-05 LAB
GLUCOSE BLDC GLUCOMTR-MCNC: 136 MG/DL (ref 70–130)
GLUCOSE BLDC GLUCOMTR-MCNC: 178 MG/DL (ref 70–130)

## 2023-10-05 PROCEDURE — 97161 PT EVAL LOW COMPLEX 20 MIN: CPT

## 2023-10-05 PROCEDURE — 97530 THERAPEUTIC ACTIVITIES: CPT

## 2023-10-05 PROCEDURE — 25010000002 PROPOFOL 10 MG/ML EMULSION: Performed by: NURSE ANESTHETIST, CERTIFIED REGISTERED

## 2023-10-05 PROCEDURE — 73560 X-RAY EXAM OF KNEE 1 OR 2: CPT

## 2023-10-05 PROCEDURE — 63710000001 METFORMIN ER 500 MG TABLET SUSTAINED-RELEASE 24 HOUR: Performed by: ORTHOPAEDIC SURGERY

## 2023-10-05 PROCEDURE — 82948 REAGENT STRIP/BLOOD GLUCOSE: CPT

## 2023-10-05 PROCEDURE — A9270 NON-COVERED ITEM OR SERVICE: HCPCS | Performed by: ORTHOPAEDIC SURGERY

## 2023-10-05 PROCEDURE — 25010000002 FENTANYL CITRATE (PF) 50 MCG/ML SOLUTION: Performed by: NURSE ANESTHETIST, CERTIFIED REGISTERED

## 2023-10-05 PROCEDURE — 25010000002 ONDANSETRON PER 1 MG: Performed by: NURSE ANESTHETIST, CERTIFIED REGISTERED

## 2023-10-05 PROCEDURE — 25010000002 FENTANYL CITRATE (PF) 50 MCG/ML SOLUTION: Performed by: ANESTHESIOLOGY

## 2023-10-05 PROCEDURE — G0378 HOSPITAL OBSERVATION PER HR: HCPCS

## 2023-10-05 PROCEDURE — 63710000001 ZOLPIDEM 5 MG TABLET: Performed by: ORTHOPAEDIC SURGERY

## 2023-10-05 PROCEDURE — 63710000001 ATENOLOL 50 MG TABLET: Performed by: ORTHOPAEDIC SURGERY

## 2023-10-05 PROCEDURE — 25010000002 DEXAMETHASONE PER 1 MG: Performed by: ANESTHESIOLOGY

## 2023-10-05 PROCEDURE — C1713 ANCHOR/SCREW BN/BN,TIS/BN: HCPCS | Performed by: ORTHOPAEDIC SURGERY

## 2023-10-05 PROCEDURE — 25010000002 ROPIVACAINE PER 1 MG: Performed by: ANESTHESIOLOGY

## 2023-10-05 PROCEDURE — 25010000002 HYDROMORPHONE PER 4 MG: Performed by: NURSE ANESTHETIST, CERTIFIED REGISTERED

## 2023-10-05 PROCEDURE — 63710000001 HYDROCODONE-ACETAMINOPHEN 7.5-325 MG TABLET: Performed by: NURSE ANESTHETIST, CERTIFIED REGISTERED

## 2023-10-05 PROCEDURE — A9270 NON-COVERED ITEM OR SERVICE: HCPCS | Performed by: NURSE ANESTHETIST, CERTIFIED REGISTERED

## 2023-10-05 PROCEDURE — 63710000001 CYCLOBENZAPRINE 10 MG TABLET: Performed by: ORTHOPAEDIC SURGERY

## 2023-10-05 PROCEDURE — C1776 JOINT DEVICE (IMPLANTABLE): HCPCS | Performed by: ORTHOPAEDIC SURGERY

## 2023-10-05 PROCEDURE — 25010000002 EPINEPHRINE 1 MG/ML SOLUTION 30 ML VIAL: Performed by: ORTHOPAEDIC SURGERY

## 2023-10-05 PROCEDURE — 63710000001 AMITRIPTYLINE 25 MG TABLET: Performed by: ORTHOPAEDIC SURGERY

## 2023-10-05 PROCEDURE — 63710000001 HYDROCODONE-ACETAMINOPHEN 10-325 MG TABLET: Performed by: ORTHOPAEDIC SURGERY

## 2023-10-05 PROCEDURE — 25010000002 ROPIVACAINE PER 1 MG: Performed by: ORTHOPAEDIC SURGERY

## 2023-10-05 PROCEDURE — 25010000002 SUGAMMADEX 200 MG/2ML SOLUTION: Performed by: NURSE ANESTHETIST, CERTIFIED REGISTERED

## 2023-10-05 PROCEDURE — 25010000002 MIDAZOLAM PER 1 MG: Performed by: ANESTHESIOLOGY

## 2023-10-05 PROCEDURE — 25010000002 CLONIDINE PER 1 MG: Performed by: ORTHOPAEDIC SURGERY

## 2023-10-05 PROCEDURE — 25010000002 CEFAZOLIN IN DEXTROSE 2-4 GM/100ML-% SOLUTION: Performed by: ORTHOPAEDIC SURGERY

## 2023-10-05 PROCEDURE — 63710000001 SENNOSIDES-DOCUSATE 8.6-50 MG TABLET: Performed by: ORTHOPAEDIC SURGERY

## 2023-10-05 PROCEDURE — 63710000001 MONTELUKAST 10 MG TABLET: Performed by: ORTHOPAEDIC SURGERY

## 2023-10-05 PROCEDURE — 25010000002 KETOROLAC TROMETHAMINE PER 15 MG: Performed by: ORTHOPAEDIC SURGERY

## 2023-10-05 PROCEDURE — 25810000003 LACTATED RINGERS PER 1000 ML: Performed by: ANESTHESIOLOGY

## 2023-10-05 DEVICE — IMPLANTABLE DEVICE
Type: IMPLANTABLE DEVICE | Site: KNEE | Status: FUNCTIONAL
Brand: PERSONA® VIVACIT-E®

## 2023-10-05 DEVICE — IMPLANTABLE DEVICE
Type: IMPLANTABLE DEVICE | Site: KNEE | Status: FUNCTIONAL
Brand: PERSONA®

## 2023-10-05 DEVICE — KNOTLESS TISSUE CONTROL DEVICE, UNDYED UNIDIRECTIONAL (ANTIBACTERIAL) SYNTHETIC ABSORBABLE DEVICE
Type: IMPLANTABLE DEVICE | Site: KNEE | Status: FUNCTIONAL
Brand: STRATAFIX

## 2023-10-05 DEVICE — CAP EXT STEM KN UPCHRG: Type: IMPLANTABLE DEVICE | Status: FUNCTIONAL

## 2023-10-05 DEVICE — CAP TOTL KN CMT PRIMARY: Type: IMPLANTABLE DEVICE | Status: FUNCTIONAL

## 2023-10-05 DEVICE — IMPLANTABLE DEVICE
Type: IMPLANTABLE DEVICE | Site: KNEE | Status: FUNCTIONAL
Brand: BIOMET® BONE CEMENT R

## 2023-10-05 DEVICE — IMPLANTABLE DEVICE
Type: IMPLANTABLE DEVICE | Site: KNEE | Status: FUNCTIONAL
Brand: PERSONA® NATURAL TIBIA®

## 2023-10-05 DEVICE — VIOLET ANTIBACTERIAL POLYDIOXANONE, KNOTLESS TISSUE CONTROL DEVICE
Type: IMPLANTABLE DEVICE | Site: KNEE | Status: FUNCTIONAL
Brand: STRATAFIX

## 2023-10-05 RX ORDER — ZOLPIDEM TARTRATE 5 MG/1
10 TABLET ORAL NIGHTLY
Status: DISCONTINUED | OUTPATIENT
Start: 2023-10-05 | End: 2023-10-06 | Stop reason: HOSPADM

## 2023-10-05 RX ORDER — AMOXICILLIN 250 MG
1 CAPSULE ORAL 2 TIMES DAILY
Status: DISCONTINUED | OUTPATIENT
Start: 2023-10-05 | End: 2023-10-06 | Stop reason: HOSPADM

## 2023-10-05 RX ORDER — ROCURONIUM BROMIDE 10 MG/ML
INJECTION, SOLUTION INTRAVENOUS AS NEEDED
Status: DISCONTINUED | OUTPATIENT
Start: 2023-10-05 | End: 2023-10-05 | Stop reason: SURG

## 2023-10-05 RX ORDER — LIDOCAINE HYDROCHLORIDE 20 MG/ML
INJECTION, SOLUTION EPIDURAL; INFILTRATION; INTRACAUDAL; PERINEURAL AS NEEDED
Status: DISCONTINUED | OUTPATIENT
Start: 2023-10-05 | End: 2023-10-05 | Stop reason: SURG

## 2023-10-05 RX ORDER — LIDOCAINE HYDROCHLORIDE 10 MG/ML
0.5 INJECTION, SOLUTION INFILTRATION; PERINEURAL ONCE AS NEEDED
Status: DISCONTINUED | OUTPATIENT
Start: 2023-10-05 | End: 2023-10-05 | Stop reason: HOSPADM

## 2023-10-05 RX ORDER — ONDANSETRON 2 MG/ML
4 INJECTION INTRAMUSCULAR; INTRAVENOUS ONCE AS NEEDED
Status: DISCONTINUED | OUTPATIENT
Start: 2023-10-05 | End: 2023-10-05 | Stop reason: HOSPADM

## 2023-10-05 RX ORDER — ONDANSETRON 2 MG/ML
4 INJECTION INTRAMUSCULAR; INTRAVENOUS EVERY 6 HOURS PRN
Status: DISCONTINUED | OUTPATIENT
Start: 2023-10-05 | End: 2023-10-06 | Stop reason: HOSPADM

## 2023-10-05 RX ORDER — FENTANYL CITRATE 50 UG/ML
INJECTION, SOLUTION INTRAMUSCULAR; INTRAVENOUS AS NEEDED
Status: DISCONTINUED | OUTPATIENT
Start: 2023-10-05 | End: 2023-10-05 | Stop reason: SURG

## 2023-10-05 RX ORDER — CEFAZOLIN SODIUM 2 G/100ML
2000 INJECTION, SOLUTION INTRAVENOUS EVERY 8 HOURS
Status: COMPLETED | OUTPATIENT
Start: 2023-10-05 | End: 2023-10-06

## 2023-10-05 RX ORDER — ROPIVACAINE HYDROCHLORIDE 5 MG/ML
INJECTION, SOLUTION EPIDURAL; INFILTRATION; PERINEURAL
Status: COMPLETED | OUTPATIENT
Start: 2023-10-05 | End: 2023-10-05

## 2023-10-05 RX ORDER — ONDANSETRON 2 MG/ML
INJECTION INTRAMUSCULAR; INTRAVENOUS AS NEEDED
Status: DISCONTINUED | OUTPATIENT
Start: 2023-10-05 | End: 2023-10-05 | Stop reason: SURG

## 2023-10-05 RX ORDER — ASPIRIN 81 MG/1
81 TABLET ORAL EVERY 12 HOURS SCHEDULED
Status: DISCONTINUED | OUTPATIENT
Start: 2023-10-06 | End: 2023-10-06 | Stop reason: HOSPADM

## 2023-10-05 RX ORDER — PROMETHAZINE HYDROCHLORIDE 25 MG/1
25 TABLET ORAL EVERY 6 HOURS PRN
Status: DISCONTINUED | OUTPATIENT
Start: 2023-10-05 | End: 2023-10-06 | Stop reason: HOSPADM

## 2023-10-05 RX ORDER — HYDROCODONE BITARTRATE AND ACETAMINOPHEN 10; 325 MG/1; MG/1
1 TABLET ORAL EVERY 4 HOURS PRN
Status: DISCONTINUED | OUTPATIENT
Start: 2023-10-05 | End: 2023-10-06 | Stop reason: HOSPADM

## 2023-10-05 RX ORDER — FAMOTIDINE 10 MG/ML
20 INJECTION, SOLUTION INTRAVENOUS ONCE
Status: COMPLETED | OUTPATIENT
Start: 2023-10-05 | End: 2023-10-05

## 2023-10-05 RX ORDER — DROPERIDOL 2.5 MG/ML
0.62 INJECTION, SOLUTION INTRAMUSCULAR; INTRAVENOUS
Status: DISCONTINUED | OUTPATIENT
Start: 2023-10-05 | End: 2023-10-05 | Stop reason: HOSPADM

## 2023-10-05 RX ORDER — SCOLOPAMINE TRANSDERMAL SYSTEM 1 MG/1
1 PATCH, EXTENDED RELEASE TRANSDERMAL ONCE
Status: DISCONTINUED | OUTPATIENT
Start: 2023-10-05 | End: 2023-10-05

## 2023-10-05 RX ORDER — MAGNESIUM HYDROXIDE 1200 MG/15ML
LIQUID ORAL AS NEEDED
Status: DISCONTINUED | OUTPATIENT
Start: 2023-10-05 | End: 2023-10-05 | Stop reason: HOSPADM

## 2023-10-05 RX ORDER — HYDRALAZINE HYDROCHLORIDE 20 MG/ML
5 INJECTION INTRAMUSCULAR; INTRAVENOUS
Status: DISCONTINUED | OUTPATIENT
Start: 2023-10-05 | End: 2023-10-05 | Stop reason: HOSPADM

## 2023-10-05 RX ORDER — PHENYLEPHRINE HCL IN 0.9% NACL 1 MG/10 ML
SYRINGE (ML) INTRAVENOUS AS NEEDED
Status: DISCONTINUED | OUTPATIENT
Start: 2023-10-05 | End: 2023-10-05 | Stop reason: SURG

## 2023-10-05 RX ORDER — PROMETHAZINE HYDROCHLORIDE 12.5 MG/1
12.5 SUPPOSITORY RECTAL EVERY 6 HOURS PRN
Status: DISCONTINUED | OUTPATIENT
Start: 2023-10-05 | End: 2023-10-06 | Stop reason: HOSPADM

## 2023-10-05 RX ORDER — PANTOPRAZOLE SODIUM 40 MG/1
40 TABLET, DELAYED RELEASE ORAL
Status: DISCONTINUED | OUTPATIENT
Start: 2023-10-06 | End: 2023-10-06 | Stop reason: HOSPADM

## 2023-10-05 RX ORDER — KETOROLAC TROMETHAMINE 30 MG/ML
30 INJECTION, SOLUTION INTRAMUSCULAR; INTRAVENOUS EVERY 6 HOURS PRN
Status: DISCONTINUED | OUTPATIENT
Start: 2023-10-05 | End: 2023-10-06 | Stop reason: HOSPADM

## 2023-10-05 RX ORDER — MELOXICAM 15 MG/1
15 TABLET ORAL DAILY
Status: DISCONTINUED | OUTPATIENT
Start: 2023-10-05 | End: 2023-10-06 | Stop reason: HOSPADM

## 2023-10-05 RX ORDER — PROMETHAZINE HYDROCHLORIDE 25 MG/1
25 SUPPOSITORY RECTAL ONCE AS NEEDED
Status: DISCONTINUED | OUTPATIENT
Start: 2023-10-05 | End: 2023-10-05 | Stop reason: HOSPADM

## 2023-10-05 RX ORDER — FENTANYL CITRATE 50 UG/ML
50 INJECTION, SOLUTION INTRAMUSCULAR; INTRAVENOUS ONCE AS NEEDED
Status: COMPLETED | OUTPATIENT
Start: 2023-10-05 | End: 2023-10-05

## 2023-10-05 RX ORDER — DIPHENHYDRAMINE HYDROCHLORIDE 50 MG/ML
12.5 INJECTION INTRAMUSCULAR; INTRAVENOUS
Status: DISCONTINUED | OUTPATIENT
Start: 2023-10-05 | End: 2023-10-05 | Stop reason: HOSPADM

## 2023-10-05 RX ORDER — NALOXONE HCL 0.4 MG/ML
0.2 VIAL (ML) INJECTION AS NEEDED
Status: DISCONTINUED | OUTPATIENT
Start: 2023-10-05 | End: 2023-10-05 | Stop reason: HOSPADM

## 2023-10-05 RX ORDER — LEVOTHYROXINE SODIUM 0.15 MG/1
150 TABLET ORAL
Status: DISCONTINUED | OUTPATIENT
Start: 2023-10-06 | End: 2023-10-06 | Stop reason: HOSPADM

## 2023-10-05 RX ORDER — LABETALOL HYDROCHLORIDE 5 MG/ML
5 INJECTION, SOLUTION INTRAVENOUS
Status: DISCONTINUED | OUTPATIENT
Start: 2023-10-05 | End: 2023-10-05 | Stop reason: HOSPADM

## 2023-10-05 RX ORDER — PROMETHAZINE HYDROCHLORIDE 25 MG/1
25 TABLET ORAL ONCE AS NEEDED
Status: DISCONTINUED | OUTPATIENT
Start: 2023-10-05 | End: 2023-10-05 | Stop reason: HOSPADM

## 2023-10-05 RX ORDER — CEPHALEXIN 500 MG/1
500 CAPSULE ORAL 4 TIMES DAILY
Qty: 4 CAPSULE | Refills: 0 | Status: SHIPPED | OUTPATIENT
Start: 2023-10-05 | End: 2023-10-06

## 2023-10-05 RX ORDER — HYDROCODONE BITARTRATE AND ACETAMINOPHEN 7.5; 325 MG/1; MG/1
1 TABLET ORAL ONCE AS NEEDED
Status: COMPLETED | OUTPATIENT
Start: 2023-10-05 | End: 2023-10-05

## 2023-10-05 RX ORDER — CYCLOBENZAPRINE HCL 10 MG
10 TABLET ORAL 3 TIMES DAILY
Status: DISCONTINUED | OUTPATIENT
Start: 2023-10-05 | End: 2023-10-06 | Stop reason: HOSPADM

## 2023-10-05 RX ORDER — ACETAMINOPHEN 500 MG
1000 TABLET ORAL ONCE
Status: COMPLETED | OUTPATIENT
Start: 2023-10-05 | End: 2023-10-05

## 2023-10-05 RX ORDER — FENTANYL CITRATE 50 UG/ML
50 INJECTION, SOLUTION INTRAMUSCULAR; INTRAVENOUS
Status: DISCONTINUED | OUTPATIENT
Start: 2023-10-05 | End: 2023-10-05 | Stop reason: HOSPADM

## 2023-10-05 RX ORDER — EPHEDRINE SULFATE 50 MG/ML
5 INJECTION, SOLUTION INTRAVENOUS ONCE AS NEEDED
Status: DISCONTINUED | OUTPATIENT
Start: 2023-10-05 | End: 2023-10-05 | Stop reason: HOSPADM

## 2023-10-05 RX ORDER — SODIUM CHLORIDE 0.9 % (FLUSH) 0.9 %
3-10 SYRINGE (ML) INJECTION AS NEEDED
Status: DISCONTINUED | OUTPATIENT
Start: 2023-10-05 | End: 2023-10-05 | Stop reason: HOSPADM

## 2023-10-05 RX ORDER — METFORMIN HYDROCHLORIDE 500 MG/1
1000 TABLET, EXTENDED RELEASE ORAL 2 TIMES DAILY
Status: DISCONTINUED | OUTPATIENT
Start: 2023-10-05 | End: 2023-10-06 | Stop reason: HOSPADM

## 2023-10-05 RX ORDER — SODIUM CHLORIDE, SODIUM LACTATE, POTASSIUM CHLORIDE, CALCIUM CHLORIDE 600; 310; 30; 20 MG/100ML; MG/100ML; MG/100ML; MG/100ML
9 INJECTION, SOLUTION INTRAVENOUS CONTINUOUS
Status: DISCONTINUED | OUTPATIENT
Start: 2023-10-05 | End: 2023-10-06 | Stop reason: HOSPADM

## 2023-10-05 RX ORDER — IPRATROPIUM BROMIDE AND ALBUTEROL SULFATE 2.5; .5 MG/3ML; MG/3ML
3 SOLUTION RESPIRATORY (INHALATION) ONCE AS NEEDED
Status: DISCONTINUED | OUTPATIENT
Start: 2023-10-05 | End: 2023-10-05 | Stop reason: HOSPADM

## 2023-10-05 RX ORDER — MELOXICAM 15 MG/1
15 TABLET ORAL ONCE
Status: COMPLETED | OUTPATIENT
Start: 2023-10-05 | End: 2023-10-05

## 2023-10-05 RX ORDER — KETAMINE HCL IN NACL, ISO-OSM 100MG/10ML
SYRINGE (ML) INJECTION AS NEEDED
Status: DISCONTINUED | OUTPATIENT
Start: 2023-10-05 | End: 2023-10-05 | Stop reason: SURG

## 2023-10-05 RX ORDER — PROMETHAZINE HYDROCHLORIDE 12.5 MG/1
12.5 TABLET ORAL EVERY 6 HOURS PRN
Status: DISCONTINUED | OUTPATIENT
Start: 2023-10-05 | End: 2023-10-06 | Stop reason: HOSPADM

## 2023-10-05 RX ORDER — MIDAZOLAM HYDROCHLORIDE 1 MG/ML
1 INJECTION INTRAMUSCULAR; INTRAVENOUS
Status: DISCONTINUED | OUTPATIENT
Start: 2023-10-05 | End: 2023-10-05 | Stop reason: HOSPADM

## 2023-10-05 RX ORDER — HYDROMORPHONE HYDROCHLORIDE 1 MG/ML
0.5 INJECTION, SOLUTION INTRAMUSCULAR; INTRAVENOUS; SUBCUTANEOUS
Status: DISCONTINUED | OUTPATIENT
Start: 2023-10-05 | End: 2023-10-05 | Stop reason: HOSPADM

## 2023-10-05 RX ORDER — SODIUM CHLORIDE 0.9 % (FLUSH) 0.9 %
3 SYRINGE (ML) INJECTION EVERY 12 HOURS SCHEDULED
Status: DISCONTINUED | OUTPATIENT
Start: 2023-10-05 | End: 2023-10-05 | Stop reason: HOSPADM

## 2023-10-05 RX ORDER — HYDROCODONE BITARTRATE AND ACETAMINOPHEN 7.5; 325 MG/1; MG/1
2 TABLET ORAL EVERY 4 HOURS PRN
Status: DISCONTINUED | OUTPATIENT
Start: 2023-10-05 | End: 2023-10-05 | Stop reason: HOSPADM

## 2023-10-05 RX ORDER — DIAZEPAM 5 MG/1
5 TABLET ORAL EVERY 6 HOURS PRN
Status: DISCONTINUED | OUTPATIENT
Start: 2023-10-05 | End: 2023-10-06 | Stop reason: HOSPADM

## 2023-10-05 RX ORDER — AMITRIPTYLINE HYDROCHLORIDE 25 MG/1
25 TABLET, FILM COATED ORAL NIGHTLY
Status: DISCONTINUED | OUTPATIENT
Start: 2023-10-05 | End: 2023-10-06 | Stop reason: HOSPADM

## 2023-10-05 RX ORDER — CEFAZOLIN SODIUM 2 G/100ML
2000 INJECTION, SOLUTION INTRAVENOUS ONCE
Status: COMPLETED | OUTPATIENT
Start: 2023-10-05 | End: 2023-10-05

## 2023-10-05 RX ORDER — ATENOLOL 50 MG/1
50 TABLET ORAL EVERY EVENING
Status: DISCONTINUED | OUTPATIENT
Start: 2023-10-05 | End: 2023-10-06 | Stop reason: HOSPADM

## 2023-10-05 RX ORDER — DEXAMETHASONE SODIUM PHOSPHATE 4 MG/ML
INJECTION, SOLUTION INTRA-ARTICULAR; INTRALESIONAL; INTRAMUSCULAR; INTRAVENOUS; SOFT TISSUE
Status: COMPLETED | OUTPATIENT
Start: 2023-10-05 | End: 2023-10-05

## 2023-10-05 RX ORDER — ASPIRIN 81 MG/1
81 TABLET ORAL 2 TIMES DAILY
Start: 2023-10-06 | End: 2023-11-03

## 2023-10-05 RX ORDER — HYDROCODONE BITARTRATE AND ACETAMINOPHEN 10; 325 MG/1; MG/1
1 TABLET ORAL EVERY 4 HOURS PRN
Qty: 40 TABLET | Refills: 0 | Status: SHIPPED | OUTPATIENT
Start: 2023-10-05

## 2023-10-05 RX ORDER — HYDROCODONE BITARTRATE AND ACETAMINOPHEN 10; 325 MG/1; MG/1
2 TABLET ORAL EVERY 4 HOURS PRN
Status: DISCONTINUED | OUTPATIENT
Start: 2023-10-05 | End: 2023-10-06 | Stop reason: HOSPADM

## 2023-10-05 RX ORDER — ONDANSETRON 4 MG/1
4 TABLET, FILM COATED ORAL EVERY 6 HOURS PRN
Status: DISCONTINUED | OUTPATIENT
Start: 2023-10-05 | End: 2023-10-06 | Stop reason: HOSPADM

## 2023-10-05 RX ORDER — FLUMAZENIL 0.1 MG/ML
0.2 INJECTION INTRAVENOUS AS NEEDED
Status: DISCONTINUED | OUTPATIENT
Start: 2023-10-05 | End: 2023-10-05 | Stop reason: HOSPADM

## 2023-10-05 RX ORDER — PROPOFOL 10 MG/ML
VIAL (ML) INTRAVENOUS AS NEEDED
Status: DISCONTINUED | OUTPATIENT
Start: 2023-10-05 | End: 2023-10-05 | Stop reason: SURG

## 2023-10-05 RX ORDER — KETOROLAC TROMETHAMINE 30 MG/ML
30 INJECTION, SOLUTION INTRAMUSCULAR; INTRAVENOUS EVERY 6 HOURS PRN
Status: DISCONTINUED | OUTPATIENT
Start: 2023-10-05 | End: 2023-10-05 | Stop reason: SDUPTHER

## 2023-10-05 RX ORDER — FUROSEMIDE 20 MG/1
20 TABLET ORAL AS NEEDED
Status: DISCONTINUED | OUTPATIENT
Start: 2023-10-05 | End: 2023-10-06 | Stop reason: HOSPADM

## 2023-10-05 RX ORDER — MONTELUKAST SODIUM 10 MG/1
10 TABLET ORAL NIGHTLY
Status: DISCONTINUED | OUTPATIENT
Start: 2023-10-05 | End: 2023-10-06 | Stop reason: HOSPADM

## 2023-10-05 RX ORDER — TRANEXAMIC ACID 100 MG/ML
INJECTION, SOLUTION INTRAVENOUS AS NEEDED
Status: DISCONTINUED | OUTPATIENT
Start: 2023-10-05 | End: 2023-10-05 | Stop reason: SURG

## 2023-10-05 RX ADMIN — METFORMIN HYDROCHLORIDE 1000 MG: 500 TABLET, EXTENDED RELEASE ORAL at 20:13

## 2023-10-05 RX ADMIN — KETOROLAC TROMETHAMINE 30 MG: 30 INJECTION, SOLUTION INTRAMUSCULAR at 11:40

## 2023-10-05 RX ADMIN — Medication 150 MCG: at 08:23

## 2023-10-05 RX ADMIN — HYDROMORPHONE HYDROCHLORIDE 0.5 MG: 1 INJECTION, SOLUTION INTRAMUSCULAR; INTRAVENOUS; SUBCUTANEOUS at 12:52

## 2023-10-05 RX ADMIN — PROPOFOL 25 MCG/KG/MIN: 10 INJECTION, EMULSION INTRAVENOUS at 08:05

## 2023-10-05 RX ADMIN — Medication 150 MCG: at 09:15

## 2023-10-05 RX ADMIN — MELOXICAM 15 MG: 15 TABLET ORAL at 06:30

## 2023-10-05 RX ADMIN — AMITRIPTYLINE HYDROCHLORIDE 25 MG: 25 TABLET, FILM COATED ORAL at 20:13

## 2023-10-05 RX ADMIN — FENTANYL CITRATE 50 MCG: 50 INJECTION, SOLUTION INTRAMUSCULAR; INTRAVENOUS at 07:10

## 2023-10-05 RX ADMIN — SENNOSIDES AND DOCUSATE SODIUM 1 TABLET: 50; 8.6 TABLET ORAL at 20:13

## 2023-10-05 RX ADMIN — Medication 200 MCG: at 09:30

## 2023-10-05 RX ADMIN — CYCLOBENZAPRINE 10 MG: 10 TABLET, FILM COATED ORAL at 20:13

## 2023-10-05 RX ADMIN — HYDROMORPHONE HYDROCHLORIDE 0.5 MG: 1 INJECTION, SOLUTION INTRAMUSCULAR; INTRAVENOUS; SUBCUTANEOUS at 09:59

## 2023-10-05 RX ADMIN — ROPIVACAINE HYDROCHLORIDE 15 ML: 5 INJECTION EPIDURAL; INFILTRATION; PERINEURAL at 07:22

## 2023-10-05 RX ADMIN — HYDROCODONE BITARTRATE AND ACETAMINOPHEN 1 TABLET: 7.5; 325 TABLET ORAL at 10:20

## 2023-10-05 RX ADMIN — HYDROCODONE BITARTRATE AND ACETAMINOPHEN 2 TABLET: 7.5; 325 TABLET ORAL at 16:52

## 2023-10-05 RX ADMIN — Medication 100 MCG: at 08:16

## 2023-10-05 RX ADMIN — SUGAMMADEX 200 MG: 100 INJECTION, SOLUTION INTRAVENOUS at 09:20

## 2023-10-05 RX ADMIN — SODIUM CHLORIDE, POTASSIUM CHLORIDE, SODIUM LACTATE AND CALCIUM CHLORIDE 9 ML/HR: 600; 310; 30; 20 INJECTION, SOLUTION INTRAVENOUS at 07:11

## 2023-10-05 RX ADMIN — TRANEXAMIC ACID 1000 MG: 100 INJECTION INTRAVENOUS at 08:17

## 2023-10-05 RX ADMIN — ACETAMINOPHEN 1000 MG: 500 TABLET ORAL at 07:11

## 2023-10-05 RX ADMIN — ZOLPIDEM TARTRATE 10 MG: 5 TABLET ORAL at 22:20

## 2023-10-05 RX ADMIN — MIDAZOLAM 2 MG: 1 INJECTION INTRAMUSCULAR; INTRAVENOUS at 07:10

## 2023-10-05 RX ADMIN — LIDOCAINE HYDROCHLORIDE 100 MG: 20 INJECTION, SOLUTION EPIDURAL; INFILTRATION; INTRACAUDAL; PERINEURAL at 08:00

## 2023-10-05 RX ADMIN — SCOPALAMINE 1 PATCH: 1 PATCH, EXTENDED RELEASE TRANSDERMAL at 07:10

## 2023-10-05 RX ADMIN — Medication 20 MG: at 08:35

## 2023-10-05 RX ADMIN — DEXAMETHASONE SODIUM PHOSPHATE 4 MG: 4 INJECTION, SOLUTION INTRA-ARTICULAR; INTRALESIONAL; INTRAMUSCULAR; INTRAVENOUS; SOFT TISSUE at 07:14

## 2023-10-05 RX ADMIN — CEFAZOLIN SODIUM 2000 MG: 2 INJECTION, SOLUTION INTRAVENOUS at 07:50

## 2023-10-05 RX ADMIN — Medication 30 MG: at 08:30

## 2023-10-05 RX ADMIN — TRANEXAMIC ACID 1000 MG: 100 INJECTION INTRAVENOUS at 09:06

## 2023-10-05 RX ADMIN — HYDROCODONE BITARTRATE AND ACETAMINOPHEN 1 TABLET: 10; 325 TABLET ORAL at 22:20

## 2023-10-05 RX ADMIN — HYDROMORPHONE HYDROCHLORIDE 0.5 MG: 1 INJECTION, SOLUTION INTRAMUSCULAR; INTRAVENOUS; SUBCUTANEOUS at 10:20

## 2023-10-05 RX ADMIN — CEFAZOLIN SODIUM 2000 MG: 2 INJECTION, SOLUTION INTRAVENOUS at 20:13

## 2023-10-05 RX ADMIN — PROPOFOL 150 MG: 10 INJECTION, EMULSION INTRAVENOUS at 08:00

## 2023-10-05 RX ADMIN — MONTELUKAST SODIUM 10 MG: 10 TABLET, FILM COATED ORAL at 20:13

## 2023-10-05 RX ADMIN — ROCURONIUM BROMIDE 50 MG: 10 INJECTION, SOLUTION INTRAVENOUS at 08:01

## 2023-10-05 RX ADMIN — Medication 150 MCG: at 09:12

## 2023-10-05 RX ADMIN — FENTANYL CITRATE 50 MCG: 50 INJECTION, SOLUTION INTRAMUSCULAR; INTRAVENOUS at 08:27

## 2023-10-05 RX ADMIN — ONDANSETRON 4 MG: 2 INJECTION INTRAMUSCULAR; INTRAVENOUS at 09:19

## 2023-10-05 RX ADMIN — ATENOLOL 50 MG: 50 TABLET ORAL at 20:12

## 2023-10-05 RX ADMIN — FAMOTIDINE 20 MG: 10 INJECTION INTRAVENOUS at 07:11

## 2023-10-05 NOTE — ANESTHESIA POSTPROCEDURE EVALUATION
Patient: Shantell Royal    Procedure Summary       Date: 10/05/23 Room / Location: [facility]  /  [facility]    Anesthesia Start: 0755 Anesthesia Stop: 0943    Procedure: LEFT TOTAL KNEE ARTHROPLASTY (Left: Knee) Diagnosis:     Surgeons: Tung Sandoval MD Provider: Owen Ye MD    Anesthesia Type: general ASA Status: 3            Anesthesia Type: general    Vitals  Vitals Value Taken Time   /71 10/05/23 1045   Temp 36.3 °C (97.4 °F) 10/05/23 1045   Pulse 88 10/05/23 1049   Resp 14 10/05/23 1045   SpO2 98 % 10/05/23 1049   Vitals shown include unvalidated device data.        Post Anesthesia Care and Evaluation    Patient location during evaluation: bedside  Patient participation: complete - patient participated  Level of consciousness: awake  Pain management: adequate    Airway patency: patent  Anesthetic complications: No anesthetic complications  PONV Status: controlled  Cardiovascular status: acceptable  Respiratory status: acceptable  Hydration status: acceptable    Comments: ---------------------------               10/05/23                      1045         ---------------------------   BP:          120/71         Pulse:         93           Resp:          14           Temp:   36.3 °C (97.4 °F)   SpO2:          96%         ---------------------------

## 2023-10-05 NOTE — THERAPY DISCHARGE NOTE
Patient Name: Shantell Royal  : 1965    MRN: [id number]                              Today's Date: 10/5/2023       Admit Date: 10/5/2023    Visit Dx:     ICD-10-CM ICD-9-CM   1. Status post total knee replacement, right  Z96.651 V43.65     Patient Active Problem List   Diagnosis    Status post total knee replacement, right     Past Medical History:   Diagnosis Date    Anesthesia complication     AFTER C SECTION LOST VISION FOR 24 HOURS    Anesthesia complication     DIAPHRAGM PARALYZED AFTER SHOULDER SURGERY    Anxiety     Arthritis     Chronic pain     BACK    Constipation     DDD (degenerative disc disease), lumbar     Diabetes mellitus     Diverticular disease     GERD (gastroesophageal reflux disease)     History of COVID-19 2021    HOSPITALIZED AT [facility]    Hyperlipidemia     Hypertension     Hypothyroidism     Iron deficiency anemia     Migraine     PONV (postoperative nausea and vomiting)     PPD positive     1 YEAR OF TREATMENT    Sinus infection     ON CURRENT ANTIBX NAD STERIOD PACK (STARTED ON 23)    Sleep apnea     USES CPAP RARELY    Tachycardia     Umbilical hernia      Past Surgical History:   Procedure Laterality Date    APPENDECTOMY      CARPAL TUNNEL RELEASE Right      SECTION      GALLBLADDER SURGERY      KNEE ARTHROSCOPY Right     LUMBAR FUSION      ROTATOR CUFF REPAIR Right     SALPINGO OOPHORECTOMY Left     TONSILLECTOMY      TOTAL KNEE ARTHROPLASTY Right 02/15/2022    Procedure: RIGHT TOTAL KNEE ARTHROPLASTY;  Surgeon: Tung Sandoval MD;  Location: [facility];  Service: Orthopedics;  Laterality: Right;    UMBILICAL HERNIA REPAIR      VENTRAL HERNIA REPAIR        General Information       Row Name 10/05/23 1446          Physical Therapy Time and Intention    Document Type discharge evaluation/summary  -EE     Mode of Treatment physical therapy;individual therapy  -EE       Row Name 10/05/23 1446          General Information    Existing  Precautions/Restrictions fall  -EE     Barriers to Rehab none identified  -EE       Row Name 10/05/23 1446          Living Environment    People in Home spouse  -EE       Row Name 10/05/23 1446          Home Main Entrance    Number of Stairs, Main Entrance two  -EE     Stair Railings, Main Entrance railings safe and in good condition  -EE       Row Name 10/05/23 1446          Cognition    Orientation Status (Cognition) oriented x 4  -EE       Row Name 10/05/23 1446          Safety Issues, Functional Mobility    Impairments Affecting Function (Mobility) endurance/activity tolerance;sensation/sensory awareness;range of motion (ROM);strength;pain  -EE               User Key  (r) = Recorded By, (t) = Taken By, (c) = Cosigned By      Initials Name Provider Type    EE Jess Lim, PT Physical Therapist                   Mobility       Row Name 10/05/23 1447          Bed Mobility    Comment, (Bed Mobility) pt UIC  -       Row Name 10/05/23 1447          Sit-Stand Transfer    Sit-Stand [place] (Transfers) contact guard;verbal cues  -EE     Assistive Device (Sit-Stand Transfers) walker, front-wheeled  -EE     Comment, (Sit-Stand Transfer) cues for hand placement, STS x1 from chair, x1 from toilet  -EE       Row Name 10/05/23 1447          Gait/Stairs (Locomotion)    [place] Level (Gait) contact guard;verbal cues  -EE     Assistive Device (Gait) walker, front-wheeled  -EE     Patient was able to Ambulate yes  -EE     Distance in Feet (Gait) 35'  -EE     Deviations/Abnormal Patterns (Gait) matt decreased;left sided deviations;antalgic;stride length decreased;weight shifting decreased  -EE     Bilateral Gait Deviations forward flexed posture  -EE     Left Sided Gait Deviations heel strike decreased;weight shift ability decreased  -EE     Comment, (Gait/Stairs) Cues for gait sequencing with rwx and to try to  L foot when stepping forward. Pt c/o ongoing numbness in foot; however, demos good motor control  with no foot drop. Able to weight shift side to side and ambulating with no buckling of L knee.  -EE               User Key  (r) = Recorded By, (t) = Taken By, (c) = Cosigned By      Initials Name Provider Type    Jess Coffey, CHRISTIE Physical Therapist                   Obj/Interventions       Row Name 10/05/23 1448          Range of Motion Comprehensive    General Range of Motion lower extremity range of motion deficits identified  -EE     Comment, General Range of Motion L knee ROM impaired ~50% due to pain; all other LE AROM WFL  -EE       Row Name 10/05/23 1448          Strength Comprehensive (MMT)    General Manual Muscle Testing (MMT) Assessment lower extremity strength deficits identified  -EE     Comment, General Manual Muscle Testing (MMT) Assessment L quad appears grossly >= 3/5; L LE weakness noted functionally  -EE       Row Name 10/05/23 1448          Motor Skills    Therapeutic Exercise --  x10 L quad sets, heel slides, ankle pumps  -EE       Row Name 10/05/23 1448          Sensory Assessment (Somatosensory)    Sensory Assessment (Somatosensory) left LE  -EE     Left LE Sensory Assessment general sensation;light touch awareness;impaired  Numbness L foot/ankle with impaired light touch sensation distally and plantar surface of L foot  -EE               User Key  (r) = Recorded By, (t) = Taken By, (c) = Cosigned By      Initials Name Provider Type    Jess Coffey, CHRISTIE Physical Therapist                   Goals/Plan    No documentation.                  Clinical Impression       Row Name 10/05/23 1449          Pain    Pretreatment Pain Rating 9/10  with activity  -EE     Posttreatment Pain Rating 2/10  at rest  -EE     Pain Location - Side/Orientation Left  -EE     Pain Location incisional  -EE     Pain Location - knee  -EE     Pain Intervention(s) Repositioned;Elevated;Cold applied  -       Row Name 10/05/23 1449          Plan of Care Review    Plan of Care Reviewed With patient;spouse  -EE      Outcome Evaluation Pt is a 59 yo female who presents s/p L TKA demonstrating post op pain, impaired L knee ROM, L LE weakness, and impaired gait mechanics. Pt c/o ongoing numbness L foot; however, demos good motor control of both L foot and L knee. Pt able to weight shift side to side and ambulate with no evidence of L knee buckling. Pt also demos no L foot drop. Pt ambulated 35' with rwx and CGA. Reviewed technique and sequencing for stairs at home and pt verbalized understanding. Therapist did express concern if L foot remains numb, poses some safety risk for stairs with pt needing to visually check that foot is properly placed on step when negotiating stairs. Discussed with RN. Pt planning to DC home and continue with HHPT; has rwx at home. Once L foot numbness subsides, no problems anticipated.  -EE       Row Name 10/05/23 1449          Therapy Assessment/Plan (PT)    Criteria for Skilled Interventions Met (PT) yes;meets criteria;skilled treatment is necessary  recommend continuing with  PT  -EE       Row Name 10/05/23 1449          Positioning and Restraints    Pre-Treatment Position sitting in chair/recliner  -EE     Post Treatment Position chair  -EE     In Chair reclined;call light within reach;encouraged to call for assist;with family/caregiver;notified nsg;legs elevated  -EE               User Key  (r) = Recorded By, (t) = Taken By, (c) = Cosigned By      Initials Name Provider Type    EE Jess Lim, PT Physical Therapist                   Outcome Measures       Row Name 10/05/23 2427          How much help from another person do you currently need...    Turning from your back to your side while in flat bed without using bedrails? 4  -EE     Moving from lying on back to sitting on the side of a flat bed without bedrails? 4  -EE     Moving to and from a bed to a chair (including a wheelchair)? 3  -EE     Standing up from a chair using your arms (e.g., wheelchair, bedside chair)? 3  -EE     Climbing 3-5  steps with a railing? 3  -EE     To walk in hospital room? 3  -EE     AM-PAC 6 Clicks Score (PT) 20  -EE     Highest level of mobility 6 --> Walked 10 steps or more  -EE       Row Name 10/05/23 1453          Functional Assessment    Outcome Measure Options AM-PAC 6 Clicks Basic Mobility (PT)  -EE               User Key  (r) = Recorded By, (t) = Taken By, (c) = Cosigned By      Initials Name Provider Type    EE Jess Lim PT Physical Therapist                  Physical Therapy Education       Title: PT OT SLP Therapies (In Progress)       Topic: Physical Therapy (In Progress)       Point: Mobility training (Done)       Learning Progress Summary             Patient Acceptance, E,TB, VU,DU by EE at 10/5/2023 1454                         Point: Home exercise program (Done)       Learning Progress Summary             Patient Acceptance, E,TB, VU,DU by EE at 10/5/2023 1454                         Point: Body mechanics (Done)       Learning Progress Summary             Patient Acceptance, E,TB, VU,DU by EE at 10/5/2023 1454                         Point: Precautions (Not Started)       Learner Progress:  Not documented in this visit.                              User Key       Initials Effective Dates Name Provider Type Discipline    EE 06/16/21 -  Jess Lim PT Physical Therapist PT                  PT Recommendation and Plan     Plan of Care Reviewed With: patient, spouse  Outcome Evaluation: Pt is a 57 yo female who presents s/p L TKA demonstrating post op pain, impaired L knee ROM, L LE weakness, and impaired gait mechanics. Pt c/o ongoing numbness L foot; however, demos good motor control of both L foot and L knee. Pt able to weight shift side to side and ambulate with no evidence of L knee buckling. Pt also demos no L foot drop. Pt ambulated 35' with rwx and CGA. Reviewed technique and sequencing for stairs at home and pt verbalized understanding. Therapist did express concern if L foot remains numb, poses some  safety risk for stairs with pt needing to visually check that foot is properly placed on step when negotiating stairs. Discussed with RN. Pt planning to DC home and continue with HHPT; has rwx at home. Once L foot numbness subsides, no problems anticipated.     Time Calculation:         PT Charges       Row Name 10/05/23 1454             Time Calculation    Start Time 1410  -EE      Stop Time 1440  -EE      Time Calculation (min) 30 min  -EE      PT Received On 10/05/23  -EE         Time Calculation- PT    Total Timed Code Minutes- PT 15 minute(s)  -EE                User Key  (r) = Recorded By, (t) = Taken By, (c) = Cosigned By      Initials Name Provider Type    EE Jess Lim, PT Physical Therapist                  Therapy Charges for Today       Code Description Service Date Service Provider Modifiers Qty    58533988688 HC PT EVAL LOW COMPLEXITY 2 10/5/2023 Jess Lim, PT GP 1    95151073345 HC PT THERAPEUTIC ACT EA 15 MIN 10/5/2023 Jess Lim, PT GP 1            PT G-Codes  Outcome Measure Options: AM-PAC 6 Clicks Basic Mobility (PT)  AM-PAC 6 Clicks Score (PT): 20    PT Discharge Summary  Anticipated Discharge Disposition (PT): home with assist, home with home health    Jess Lim PT  10/5/2023

## 2023-10-05 NOTE — ANESTHESIA PROCEDURE NOTES
Peripheral Block      Patient reassessed immediately prior to procedure    Patient location during procedure: pre-op  Start time: 10/5/2023 7:14 AM  Stop time: 10/5/2023 7:20 AM  Reason for block: at surgeon's request and post-op pain management  Performed by  Anesthesiologist: Owen Ye MD  Preanesthetic Checklist  Completed: patient identified and risks and benefits discussed  Prep:  Sterile barriers:gloves  Prep: ChloraPrep  Patient monitoring: blood pressure monitoring, continuous pulse oximetry and EKG  Procedure    Sedation: yes  Performed under: local infiltration  Guidance:ultrasound guided    ULTRASOUND INTERPRETATION.  Using ultrasound guidance a 21 G gauge needle was placed in close proximity to the nerve, at which point, under ultrasound guidance anesthetic was injected in the area of the nerve and spread of the anesthesia was seen on ultrasound in close proximity thereto.  There were no abnormalities seen on ultrasound; a digital image was taken; and the patient tolerated the procedure with no complications. Images:still images obtained, printed/placed on chart    Laterality:left  Block Type:adductor canal block  Injection Technique:single-shot  Needle Type:short-bevel  Needle Gauge:21 G      Medications Used: ropivacaine (NAROPIN) 0.5 % injection - Injection   15 mL - 10/5/2023 7:22:00 AM  dexamethasone (DECADRON) injection - Injection   4 mg - 10/5/2023 7:14:00 AM      Medications  Comment:      Post Assessment  Injection Assessment: negative aspiration for heme, no paresthesia on injection and incremental injection  Patient Tolerance:comfortable throughout block  Complications:no  Additional Notes  Ultrasound Interpretation:  Using ultrasound guidance the needle was placed in adductor canal and anesthetic was injected in the area of the saphenous nerve and spread of the anesthetic was seen on ultrasound in close proximity thereto.  There were no abnormalities seen on ultrasound; a digital image  was taken; and the patient tolerated the procedure with no complications.    Block placed for postoperative pain control per surgeon request.

## 2023-10-05 NOTE — ANESTHESIA PREPROCEDURE EVALUATION
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  )    Anesthetic plan, risks, benefits, and alternatives have been provided, discussed and informed consent has been obtained with: patient.    CODE STATUS:

## 2023-10-05 NOTE — DISCHARGE PLACEMENT REQUEST
"Riky Xavier (58 y.o. Female)       Date of Birth   1965    Social Security Number       Address   [address] [place] [address]    Home Phone       MRN   [id number]       [facility]    Marital Status                               Admission Date   10/5/23    Admission Type   Elective    Admitting Provider   Tung Sandoval MD    Attending Provider   Tung Sandoval MD    Department, Room/Bed   [facility] OSC OR, [facility]SC OR/OSC OR       Discharge Date       Discharge Disposition   Home or Self Care    Discharge Destination                                 Attending Provider: Tung Sandoval MD    Allergies: Eletriptan, Sulfa Antibiotics, Sumatriptan, Zolmitriptan, Caffeine, Chlorhexidine, Cisapride, Methylnaltrexone, Oxycodone-acetaminophen    Isolation: None   Infection: None   Code Status: Prior    Ht: 162.6 cm (64\")   Wt: 101 kg (221 lb 11.2 oz)    Admission Cmt: None   Principal Problem: None                  Active Insurance as of 10/5/2023       Primary Coverage       Payor Plan Insurance Group Employer/Plan Group    [facility]ly [facility] MEDICARE REPLACEMENT ANTH MEDICARE ADVANTAGE [id number]       Payor Plan Address Payor Plan Phone Number Payor Plan Fax Number Effective Dates    [address] [phone]  1/1/2020 - None Entered    [facility] [id number]         Subscriber Name Subscriber Birth Date Member ID       RIKY XAVIER 1965 [id number]                     Emergency Contacts        (Rel.) Home Phone Work Phone Mobile Phone    DUCROBERT (Spouse) -- -- [phone]    Jordan Xavier (Son) -- -- [phone]                "

## 2023-10-05 NOTE — ANESTHESIA PROCEDURE NOTES
Airway  Urgency: elective    Date/Time: 10/5/2023 8:04 AM  Airway not difficult    General Information and Staff    Patient location during procedure: OR  CRNA/CAA: Ondina Camarillo CRNA    Indications and Patient Condition    Preoxygenated: yes  Mask difficulty assessment: 1 - vent by mask    Final Airway Details  Final airway type: endotracheal airway      Successful airway: ETT  Cuffed: yes   Successful intubation technique: direct laryngoscopy  Facilitating devices/methods: anterior pressure/BURP  Endotracheal tube insertion site: oral  Blade: Daysi  Blade size: 3  ETT size (mm): 7.0  Cormack-Lehane Classification: grade I - full view of glottis  Placement verified by: chest auscultation and capnometry   Measured from: teeth  ETT/EBT  to teeth (cm): 20  Number of attempts at approach: 1  Assessment: lips, teeth, and gum same as pre-op and atraumatic intubation    Additional Comments  Teeth, tongue, lips, and gums in preop condition. VSS throughout. Easy mask/smooth easy airway. Tube visualized through cords.

## 2023-10-05 NOTE — PLAN OF CARE
Goal Outcome Evaluation:  Plan of Care Reviewed With: patient, spouse           Outcome Evaluation: Pt is a 59 yo female who presents s/p L TKA demonstrating post op pain, impaired L knee ROM, L LE weakness, and impaired gait mechanics. Pt c/o ongoing numbness L foot; however, demos good motor control of both L foot and L knee. Pt able to weight shift side to side and ambulate with no evidence of L knee buckling. Pt also demos no L foot drop. Pt ambulated 35' with rwx and CGA. Reviewed technique and sequencing for stairs at home and pt verbalized understanding. Therapist did express concern if L foot remains numb, poses some safety risk for stairs with pt needing to visually check that foot is properly placed on step when negotiating stairs. Discussed with RN. Pt planning to DC home and continue with HHPT; has rwx at home. Once L foot numbness subsides, no problems anticipated.      Anticipated Discharge Disposition (PT): home with assist, home with home health

## 2023-10-05 NOTE — H&P
"     Orthopaedic H&P      Patient: Shantell Royal    Date of Admission: 10/5/2023  5:59 AM    YOB: 1965    Medical Record Number: [id number]    Attending Physician:  Tung Sandoval MD    Chief Complaints: Left knee osteoarthritis    History of Present Illness: 58 y.o. female admitted to [facility] for left total knee arthroplasty.  She has failed conservative treatment.  She would like to move forward with total knee arthroplasty.  She previously had a right knee replacement and has done well with this.  No medical changes.    Allergies:   Allergies   Allergen Reactions    Eletriptan Other (See Comments)     \"chest pain\"    Sulfa Antibiotics Itching and Rash    Sumatriptan Other (See Comments)     \"tightening in throat\" but denies SOA    Zolmitriptan Other (See Comments)     Makes migraine worse    Caffeine Other (See Comments)     Can't sleep, tachycardia    Chlorhexidine Itching and Other (See Comments)     SKIN BLISTERS     Cisapride Other (See Comments)     ABD PAIN     Methylnaltrexone Itching and Rash    Oxycodone-Acetaminophen Itching and Rash       Medications:   Home Medications:  Medications Prior to Admission   Medication Sig Dispense Refill Last Dose    amitriptyline (ELAVIL) 25 MG tablet Take 1 tablet by mouth Every Night.   10/4/2023    atenolol (TENORMIN) 50 MG tablet Take 1 tablet by mouth Every Evening.   10/4/2023    cyclobenzaprine (FLEXERIL) 10 MG tablet Take 1 tablet by mouth 3 (Three) Times a Day.   10/4/2023    HYDROcodone-acetaminophen (NORCO) 7.5-325 MG per tablet Take 1 tablet by mouth Every 6 (Six) Hours As Needed for Moderate Pain.   10/5/2023 at 0415    levothyroxine (SYNTHROID, LEVOTHROID) 150 MCG tablet Take 1 tablet by mouth Daily.   10/5/2023    metFORMIN ER (GLUCOPHAGE-XR) 500 MG 24 hr tablet Take 2 tablets by mouth 2 (Two) Times a Day.   10/4/2023    montelukast (SINGULAIR) 10 MG tablet Take 1 tablet by mouth Every Night.   10/4/2023    omeprazole " (priLOSEC) 40 MG capsule Take 1 capsule by mouth Daily.   10/5/2023    zolpidem (AMBIEN) 10 MG tablet Take 1 tablet by mouth Every Night.   10/4/2023    Ascorbic Acid (VITAMIN C PO) Take 1,000 mg by mouth Daily.       aspirin-acetaminophen-caffeine (EXCEDRIN MIGRAINE) 250-250-65 MG per tablet Take 1 tablet by mouth Every 6 (Six) Hours As Needed for Headache. TO HOLD 1 WEEK PRIOR TO OR       Calcium Polycarbophil (FIBER-CAPS PO) Take 1 tablet by mouth Daily.       diclofenac (VOLTAREN) 75 MG EC tablet Take 1 tablet by mouth 2 (Two) Times a Day. INSTRUCTED PT TO FOLLOW MD INSTRUCTIONS REGARDING HOLDING FOR SURGERY   9/29/2023    Flaxseed, Linseed, (Flaxseed Oil) 1000 MG capsule Take 1,000 mg by mouth Daily. TO HOLD 2 WEEKS PRIOR TO OR       furosemide (LASIX) 20 MG tablet Take 1 tablet by mouth As Needed (SWELLING).   Unknown    Iron-Vitamin C  MG tablet Take 1 tablet by mouth 2 (Two) Times a Day.       L-Lysine 1000 MG tablet Take 1 tablet by mouth As Needed.       loratadine-pseudoephedrine (CLARITIN-D 24-hour)  MG per 24 hr tablet Take 1 tablet by mouth Daily As Needed for Allergies. TO HOLD 5-7 DAYS PRIOR TO OR       magnesium oxide (MAG-OX) 400 MG tablet Take 500 mg by mouth As Needed.       methylPREDNISolone (MEDROL) 4 MG dose pack Take  by mouth. Take as directed on package instructions.       mometasone (NASONEX) 50 MCG/ACT nasal spray 2 sprays into the nostril(s) as directed by provider As Needed.       pravastatin (PRAVACHOL) 80 MG tablet Take 1 tablet by mouth Every Night.       promethazine (PHENERGAN) 25 MG tablet Take 1 tablet by mouth Every 6 (Six) Hours As Needed for Nausea or Vomiting.       rizatriptan MLT (MAXALT-MLT) 10 MG disintegrating tablet Place 1 tablet on the tongue 1 (One) Time As Needed for Migraine. May repeat in 2 hours if needed          Current Medications:  Scheduled Meds:ceFAZolin, 2,000 mg, Intravenous, Once  Orthopedic surgery custom cocktail, , Injection,  Once  Scopolamine, 1 patch, Transdermal, Once  sodium chloride, 3 mL, Intravenous, Q12H      Continuous Infusions:lactated ringers, 9 mL/hr, Last Rate: 9 mL/hr (10/05/23 0711)      PRN Meds:.  lidocaine    midazolam    sodium chloride    Past Medical History:   Diagnosis Date    Anesthesia complication     AFTER C SECTION LOST VISION FOR 24 HOURS    Anesthesia complication     DIAPHRAGM PARALYZED AFTER SHOULDER SURGERY    Anxiety     Arthritis     Chronic pain     BACK    Constipation     DDD (degenerative disc disease), lumbar     Diabetes mellitus     Diverticular disease     GERD (gastroesophageal reflux disease)     History of COVID-19 2021    HOSPITALIZED AT [facility]    Hyperlipidemia     Hypertension     Hypothyroidism     Iron deficiency anemia     Migraine     PONV (postoperative nausea and vomiting)     PPD positive     1 YEAR OF TREATMENT    Sinus infection     ON CURRENT ANTIBX NAD STERIOD PACK (STARTED ON 23)    Sleep apnea     USES CPAP RARELY    Tachycardia     Umbilical hernia      Past Surgical History:   Procedure Laterality Date    APPENDECTOMY      CARPAL TUNNEL RELEASE Right      SECTION      GALLBLADDER SURGERY      KNEE ARTHROSCOPY Right     LUMBAR FUSION      ROTATOR CUFF REPAIR Right     SALPINGO OOPHORECTOMY Left     TONSILLECTOMY      TOTAL KNEE ARTHROPLASTY Right 02/15/2022    Procedure: RIGHT TOTAL KNEE ARTHROPLASTY;  Surgeon: Tung Sandoval MD;  Location: [facility];  Service: Orthopedics;  Laterality: Right;    UMBILICAL HERNIA REPAIR      VENTRAL HERNIA REPAIR       Social History     Occupational History    Not on file   Tobacco Use    Smoking status: Never    Smokeless tobacco: Never   Vaping Use    Vaping Use: Never used   Substance and Sexual Activity    Alcohol use: Not Currently    Drug use: Not Currently    Sexual activity: Defer      Social History     Social History Narrative    Not on file     Family History   Problem Relation Age of Onset     Learning disabilities Neg Hx        Review of Systems:   No other pertinent positives or negatives other than what is mentioned in the HPI and below.  Constitutional: Negative for fatigue, fever, or weight loss  HEENT: No active headache.  Pulmonary: Patient denies SOA.  Cardiovascular: Patient denies any chest pain.  Gastrointestinal:  Patient denies active vomiting or diarrhea.  Musculoskeletal: Positive for left knee pain.  Neurological: Patient denies active dizziness or loss of consciousness.  Skin: Patient denies any active bleeding.    Vital signs in last 24 hours:  Temp:  [98.1 °F (36.7 °C)] 98.1 °F (36.7 °C)  Heart Rate:  [74-86] 82  Resp:  [18] 18  BP: (121-143)/(73-81) 121/73  Vitals:    10/05/23 0615 10/05/23 0700 10/05/23 0715 10/05/23 0720   BP: 135/80  143/81 121/73   BP Location: Right arm  Right arm Right arm   Patient Position: Sitting  Lying Lying   Pulse: 84 74 86 82   Resp: 18  18 18   Temp: 98.1 °F (36.7 °C)      TempSrc: Oral      SpO2: 97% 95% 100% 97%         Physical Exam: 58 y.o. female        General Appearance:  Alert, cooperative, in no acute distress    HEENT:    Atraumatic, Pupils are equal   Neck:   Cervical spine midline, no appreciable JVD   Lungs:     Breathing non-labored and chest rise symmetric    Heart:   Abdomen:     Rectal:    Extremities:   Pulses  Neurovascular:   Skin:  Musculoskeletal:         Pulse regular    Soft, Non-tender or distended    Deferred    No clubbing, cyanosis, or edema    Intact    Cranial nerves 2 - 12 grossly intact, sensation intact    No skin lesions  Left knee skin intact.  Varus deformity.  Flexion contracture.  Foot warm and well-perfused.  Normal motor and sensory exam.  No skin lesions.     Assessment:  Left knee osteoarthritis    Plan:  The patient voiced understanding of the risks, benefits, and alternative forms of treatment that were discussed and the patient consents to proceed with left total knee arthroplasty as scheduled.  No changes.   All questions answered.  She would be a same-day discharge.     Date: 10/5/2023  Tung Sandoval MD

## 2023-10-05 NOTE — DISCHARGE INSTRUCTIONS
Dr. Dr. Bobo Sandoval Total Knee Joint Replacement Discharge Instructions:  Office Phone Number: [phone]    I. ACTIVITIES:  1. Exercises:  Complete exercise program as taught by the hospital physical therapist 2 times per day  Exercise program will be advanced by the physical therapist  During the day be up ambulating every 2 hours (while awake) for short distances  Complete the ankle pump exercises at least 10 times per hour (while awake)  Elevate legs most of the day the first week post operatively and thereafter elevate legs when in bed and for at least 30 minutes during the day. Caution must be taken to avoid pillow placement under the bend of the knee as this can led to flexion contractures of the knee.  Use cold packs 20-30 minutes approximately 5 times per day. This should be done before and after completing your exercises and at any time you are experiencing pain/ stiffness in your operative extremity.      2. Activities of Daily Living:  No tub baths, hot tubs, or swimming pools for 4 weeks  The clear dressing with thin white gauze strip dressing is waterproof.  You may shower without covering the dressing.  After 7 days you may remove the dressing.  After dressing removal, do not scrub or rub the incision. Allow skin glue to fall off over the next few weeks.  After the dressing is removed, simply let the water run over the incision and pat dry.    II. Restrictions  Do not kneel on operative leg.  Dr. Sandoval will discuss with you when you will be able to drive again.  Weight bearing is as tolerated, and range of motion as tolerated.  First week stay inside on even terrain. May go up and down stairs one stair at a time utilizing the hand rail.  Once you feel confident, you may venture outside.    III. Precautions:  Everyone that comes near you should wash their hands  Avoid if possible dental work or other elective surgeries within 12 weeks of your surgical procedure.   If dental work or surgical  procedure is deemed absolutely necessary within 12 weeks of surgery, you will need to contact Dr. Sandoval office as you will need to take antibiotics 1 hour prior to any dental work (including teeth cleanings).  Dr. Sandoval will prescribe prophylactic antibiotics for all dental procedures for one year  as a precautionary measure to minimize risk of infection.  If you are a diabetic or take immunosuppressive medication, you may have to take prophylactic antibiotics the remainder of your life before dental work.    Avoid sick people. If you must be around someone who is ill, they should wear a mask.  Avoid visits to the Emergency Room or Urgent Care unless you are having a life threatening event.   Dr. Sandoval does not routinely place on stockings, but if you are having swelling in your feet or ankle then you may obtain stockings from your local pharmacy or [facility].   Stockings are to be placed on in the morning and removed at night. Monitor the stockings to ensure that any swelling is not causing the stockings to become too tight. In this case, remove stockings immediately.    IV. INCISION CARE:   Dr. Bobo Sandoval takes great care in closing your incision to give you the best opportunity for a healthy incision with minimal scarring. He places sutures below the skin surface that will eventually dissolve.  The incision is then covered with a skin glue which makes the incision water tight, and minimizes bleeding onto the dressing.  No staples are used.  Occasionally one of the buried stitches may come to the skin surface and may need to be removed.  Please resist the temptation of removing the stitch by yourself.  Dr. Sandoval will be happy to remove it for you.  Bruising around the knee and back of thigh is normal and to be expected.  You may also have pain and or bruising in the thigh where a tourniquet was used.    Please keep dressing in place at least until post-op day 7. You may remove and replace  dressing before day 7 if the dressing begins to fall off or becomes saturated. Wash your hands and under your finger nails prior to dressing changes.  After day 7 as long as incision is dry and intact, you may leave the dressing off and open to air.    If dressing must be changed, utilize dry gauze and paper tape. Avoid touching the side of the gauze that goes against the incision with your hands.  No creams or ointments to the incision until permission given by Dr. Sandoval.  Do not touch or pick at the incision, or try to remove any sutures or skin glue.  Check dressing every day and notify surgeon immediately if any of the following signs or symptoms are noted:  Increase in redness  Increase in swelling of the entire extremity that does not go away with elevation.  Notify office that you may have a blood clot.    Drainage oozing from the incision  Pulling apart of the edges of the incision  Increase in overall body temperature (greater than 100.5 degrees)    V. Medications:   1. Anticoagulants: You will be discharged on an anticoagulant. This is a prophylactic medication that helps prevent blood clots during your post-operative period. The type and length of dosage varies based on your individual needs, procedure performed, and Dr. Sandoval's preference.  While taking the anticoagulant, you should avoid taking any additional aspirin than what is prescribed.   Notify surgeon immediately if any kinga bleeding is noted in the urine, stool, emesis, or from the nose or the incision. Blood in the stool will often appear as black rather than red. Blood in urine may appear as pink. Blood in emesis may appear as brown/black like coffee grounds.  You will need to apply pressure for longer periods of time to any cuts or abrasions to stop bleeding  Avoid alcohol while taking anticoagulants.    2. Stool Softeners: You will be at greater risk of constipation after surgery due to being less mobile and the pain medications.   Take  stool softeners as instructed by your surgeon while on pain medications. Over the counter Colace 100 mg 1-2 capsules twice daily.   If stools become too loose or too frequent, please decreases the dosage or stop the stool softener.  If constipation occurs despite use of stool softeners, you are to continue the stool softeners and add a laxative (Milk of Magnesia 1 ounce daily as needed).  If no bowel movement occurs past 3 days, then purchase Magnesium citrate and drink 1/2 bottle every 8 hours (on ice tastes better) until success. If no bowel movement by post-operative day 5,  please call Dr. Sandoval's office for further instructions.   You may need to decrease or stop your pain medications if bowel movements to not occur.     Drink plenty of fluids, and eat fruits and vegetables during your recovery time.    3. Pain Medications utilized after surgery are narcotics and the law requires that the following information be given to all patients that are prescribed narcotics:  CLASSIFICATION: Pain medications are called Opioids and are narcotics  LEGALITIES: It is illegal to share narcotics with others and to drive within 24 hours of taking narcotics  POTENTIAL SIDE EFFECTS: Potential side effects of opioids include: nausea, vomiting, itching, dizziness, drowsiness, dry mouth, constipation, and difficulty urinating.  POTENTIAL ADVERSE EFFECTS:   Opioid tolerance can develop with use of pain medications and this simply means that it requires more and more of the medication to control pain; however, this is seen more in patients that use opioids for longer periods of time.  Opioid dependence can develop with use of Opioids and this simply means that to stop the medication can cause withdrawal symptoms; however, this is seen with patients that use Opioids for longer periods of time.  Opioid addiction can develop with use of Opioids and the incidence of this is very unlikely in patients who take the medications as ordered  "and stop the medications as instructed.  Opioid overdose can be dangerous, but is unlikely when the medication is taken as ordered and stopped when ordered. It is important not to mix opioids with alcohol or with and type of sedative such as Benadryl as this can lead to over sedation and respiratory difficulty.  DOSAGE:   Pain medications will need to be taken consistently for the first few days to decrease pain and promote adequate pain relief and participation in physical therapy.  After the initial surgical pain begins to resolve, you may begin to decrease the pain medication. By the end of 6 weeks, you should be off of pain medications except for before physical therapy or to help with pain when attempting to fall asleep.  Pain medications will be tapered to lesser dosages as you are further from your surgical day.  No pain medications will be provided after 3 months from surgery.     Refills will not be given by the office during evening hours, or weekends.  To seek refills on pain medications during the post-operative period, you must call the office 48 hours in advance to request the refill. The office will then notify you when to  the prescription. DO NOT wait until you are out of the medication to request a refill.  They can not be \"called in\" to the pharmacy.      V. FOLLOW-UP VISITS:  You will need to follow up in the office with Dr. Sandoval in 10-14 days.  Please call [phone] if you need to confirm or reschedule your appointment time.   If you have any concerns or suspected complications prior to your follow up visit, please call your surgeons office. Do not wait until your appointment time if you suspect complications. These will need to be addressed in the office promptly.          What to expect after a Nerve Block    Nerve blocks administered to block pain affect many types of nerves, including those nerves that control movement, pain, and normal sensation. Following a nerve block, you may " notice some bruising at the site where the block was given. You may experience sensations such as: numbness of the affected area or limb, tingling, heaviness (that is the limb feels heavy to you), weakness or inability to move the affected arm or leg, or a feeling as if your arm or leg has “fallen asleep.”     A nerve block can last from 2 to 36 hours depending on the medications used.  Usually the weakness wears off first followed by the tingling and heaviness. As the block wears off, you may begin to notice pain; however, this sequence of events may occur in any order. Typically, you will be able to move your limb before you will feel it. Once a nerve block begins to wear off, the effects are usually completely gone within 60 minutes.  If you experience continued side effects that you believe are block related for longer than 48 hours, please call your healthcare provider. Please see block-specific instructions below.    Instructions for any Block involving the leg/foot:   If you have had a leg /foot block, you should not bear weight on the affected leg until the block has worn off. After the block has worn off, weight bearing should be as directed by your surgeon. You may be sent home with crutches. You are at high risk for falling because of the anesthetic effects on your leg. Please use caution when standing or trying to move or walk. Have someone assist you until your leg and foot function have returned to normal.     Protection of a “blocked” limb  After a nerve block, you cannot feel pain, pressure, or extremes of temperature in the affected limb. And because of this, your blocked limb is at more risk for injury. For example, it is possible to burn your limb on an extremely hot surface without feeling it.     When resting, it is important to reposition your limb periodically to avoid prolonged pressure on it. This may require the use of pillows and padding.    While sleeping, you should avoid rolling onto  the affected limb or putting too much pressure on it.     If you have a cast or tight dressing, check the color of your fingers or toes of the affected limb. Call your surgeon if they look discolored (that is, dusky, dark colored).    Use caution in cold weather. Cover your limb appropriately to protect it from the cold.    Pain Management:  Your surgeon will give you a prescription for pain medication. Begin taking this before the nerve block wears off. Bear in mind that sometimes the block can wear off in the middle of the night.

## 2023-10-05 NOTE — OP NOTE
TOTAL KNEE ARTHROPLASTY  Procedure Note    Shantell Royal  10/5/2023    Pre-op Diagnosis:  Left Knee Osteoarthritis  Post-op Diagnosis:  Same  Procedure:  Left Total Knee Arthroplasty  Surgical Approach: Knee Medial Parapatellar   Surgeon:  Tung Sandoval MD  Anesthesia: General with Block, Anesthesiologist: Owen Ye MD  CRNA: Ondina Camarillo CRNA  Staff: Circulator: Ora Grider RN; Jose David Fink RN  Scrub Person: Sariah Santizo  Vendor Representative: Dell Dockery  Assistant: Alexsander Dao PA-C CFA  Estimated Blood Loss: 50 mL  Specimens: * No orders in the log *  Drains: none  Complications: None    Components Utilized:   Zakia  Implant Name Type Inv. Item Serial No.  Lot No. LRB No. Used Action   DEV CONTRL TISS STRATAFIX SYMM PDS PLUS ANNE CT-1 45CM - [id number] Implant DEV CONTRL TISS STRATAFIX SYMM PDS PLUS ANNE CT-1 45CM  [employer]  DIV OF J AND J TDMABT Left 1 Implanted   DEV CONTRL TISS STRATAFIX SYMM PDS PLUS ANNE CT-1 45CM - [id number] Implant DEV CONTRL TISS STRATAFIX SYMM PDS PLUS ANNE CT-1 45CM  [employer]  DIV OF J AND J TDMABT Left 1 Implanted   DEV CONTRL TISS STRATAFIXSPIRALMNCRYL PLSPS2 REV3/0 45CM - [id number] Implant DEV CONTRL TISS STRATAFIXSPIRALMNCRYL PLSPS2 REV3/0 45CM  [employer]  DIV OF J AND J THBAUD Left 1 Implanted   CMT BONE R 1X40 - [id number] Implant CMT BONE R 1X40  [employer] [id number] Left 1 Implanted   CMT BONE R 1X40 - [id number] Implant CMT BONE R 1X40  [employer] [id number] Left 1 Implanted   COMP FEM/KN PERSONA CR CMT COCR STD SZ7 LT - [id number] Implant COMP FEM/KN PERSONA CR CMT COCR STD SZ7 LT  [employer] [id number] Left 1 Implanted   PAT KN PERSONA VE CRS/LNK CMT 8.5X32MM - [id number] Implant PAT KN PERSONA VE CRS/LNK CMT 8.5X32MM  [employer] [id number] Left 1 Implanted   EXT STEM FEM/KN PERSONA TPR 68UEBY45PL - [id number] Implant EXT STEM FEM/KN PERSONA TPR [id number]  [employer]  INC [id number] Left 1 Implanted   STEM TIB/KN PERSONA CMT  5D ROMERO LT - [id number] Implant STEM TIB/KN PERSONA CMT 5D ROMERO LT  [employer] [id number] Left 1 Implanted   ART/SRF KN PERSONA/VE PS EF 6TO7 10MM LT - [id number] Implant ART/SRF KN PERSONA/VE PS EF 6TO7 10MM LT  [employer] [id number] Left 1 Implanted         Indication for Procedure:  The patient is a 58 y.o. female presents today for a total knee arthroplasty procedure because of failure to conservatively manage the patient's pain for arthritis.  She was educated in risks of surgery that could include possible risk of infection, deep venous thrombosis, pulmonary embolism, fracture, neurovascular injury, leg length discrepancy, dislocation, possible persistent pain, need for additional surgeries, anesthetic risks, medical risks including heart attack and stroke, and death.  The discussion occurred in the office pre-operatively, and patient had the opportunity to ask questions, and concerns about the proposed surgery.  She wished to proceed.      Protocols for intravenous antibiotics and venous thrombosis were followed for this patient.  IV antibiotics were infused prior to surgery and will be discontinued within 24 hours of completion of the surgical procedure.  Thrombosis prophylaxis will be initiated within 24 hours of the completion of the surgical procedure.      Procedure:  The patient was seen in the preoperative holding area where her left knee surgical site was marked, preoperative antibiotics were received, a preoperative nerve block was performed.  She was taken to the operating room and placed on the operating table.  The correct left lower extremity was prepped and draped in a typical sterile fashion.  A timeout was performed confirming the correct surgical site and procedure.  Esmarch was used to exsanguinate the leg and tourniquet was inflated to 250 mmHg.    A 10 blade scalpel was used to make a longitudinal incision from above the patella to medial to the tibial tubercle.  A medial georgina-patellar  arthrotomy was performed with another 10 blade scalpel.  The medial joint line was elevated subperiosteally with electrocautery and a Shah elevator.  The patellar fat pad was removed.  The patella was everted, measured, and osteotomy cut completed.  The patella guide and drill was used to finish preparing the patella.  A patella protecting guide was placed, and the patella was everted off the side of the femur laterally.      The knee was then flexed and Collier's line was identified.  The drill was placed into the distal femur into the medullary canal.  The intramedullary distal femoral valgus cutting guide set to 5 degrees of femoral valgus with +2 mm cut secondary to a flexion contracture.  It was then placed into the medullary canal.  It was pinned and the oscillating saw was used to make the osteotomy.  The anterior referencing distal femoral guide was placed and the above femoral size 7 was measured.  Three degrees of external rotation was set.  The 4 in 1 femoral cutting guide was placed and pinned and the oscillating saw was used to make the appropriate cuts.      The extramedullary cutting guide was placed aligned with the tibial eminence superiorly and the tibial shaft distally, pinned 4 mm from the medial tibial plateau.  Tibial slope was accounted for.  The oscillating saw was used to complete the osteotomy cuts.    A lamina  was placed medially and then laterally to remove the medial and lateral meniscus and the posterior osteophytes.  The tibial baseplate was placed on the tibial surface with a drop sammy to confirm an appropriate cut and size of implant.  This was a size E. It was then pinned externally rotated to the medial third of the tibial tubercle.  The trial reduction was performed with the above implants and was found to be stable in all planes.  The patella tracked centrally on the trochlear groove.     The lug holes were drilled in the distal femur.  The tibia was drilled and  broached in the typical fashion accounting for a stem extension.  The trial components were removed and the final implants were confirmed and opened.  The bone surfaces were then irrigated and dried.  Standard Zakia-Biomet cement was mixed and placed on the cut bone surfaces and back side of the implants.  The implants were impacted into place, and the trial polyethylene spacer was placed.  The knee was placed into extension.  A stack of towels was placed under the ankle and the cement was allowed to harden. The tourniquet was then dropped.  Hemostasis was obtained.  The wound was then irrigated with the pulse lavage irrigation system with a 3 liter mixture of betadine and normal saline.  The local anesthetic mixture was injected throughout the knee for post-operative pain control.  The final size 10 mm medial congruent polyethylene implant was then inserted and the knee was flexed to 90 degrees.  The arthrotomy was closed with #1 Vicryl suture and #1 Stratafix suture.  The subcutaneous tissue was closed with a series of #1 Vicryl suture and 2-0 Vicryl suture.  The skin was closed with 3-0 Stratafix suture.  Dermabond, Telfa, Tegaderm, and Ace bandage were applied to the knee.    Sponge and needle counts were completed and were correct.  The patient was awakened from anesthetic and was returned to recovery in stable condition.    Postoperative Plan:  The patient will be discharged home.  She will be started on Aspirin 81 mg BID for 4 weeks for dvt prophylaxis and have sequential compression devices.  She will be on a 24 hour antibiotic protocol with Keflex 500 mg every 6 hours.  She will be weight bearing as tolerated with physical therapy.    Alexsander Dao PA-C assisted for the entirety of the surgical procedure.  He was necessary for retraction, implant placement, and closure of the wound.    No complications were encountered during this surgical procedure.      Tung Sandoval MD     Date: 10/5/2023  Time: 09:41  EDT

## 2023-10-05 NOTE — PERIOPERATIVE NURSING NOTE
"PATIENT AND  CONVEY A LACK OF CONFIDENCE IN ABILITY TO GO HOME.  PATIENT CONCERNED ABOUT NUMBNESS BOTTOM OF LEFT FOOT AND 'S INABILITY TO ASSIST DUE TO \"BACK PROBLEMS\".  WILL DISCUSS WITH DR. SALTER.    "

## 2023-10-06 VITALS
HEIGHT: 64 IN | TEMPERATURE: 98 F | WEIGHT: 222.66 LBS | HEART RATE: 88 BPM | OXYGEN SATURATION: 98 % | DIASTOLIC BLOOD PRESSURE: 66 MMHG | SYSTOLIC BLOOD PRESSURE: 102 MMHG | BODY MASS INDEX: 38.01 KG/M2 | RESPIRATION RATE: 16 BRPM

## 2023-10-06 LAB
ANION GAP SERPL CALCULATED.3IONS-SCNC: 15.8 MMOL/L (ref 5–15)
BUN SERPL-MCNC: 9 MG/DL (ref 6–20)
BUN/CREAT SERPL: 11.7 (ref 7–25)
CALCIUM SPEC-SCNC: 8.6 MG/DL (ref 8.6–10.5)
CHLORIDE SERPL-SCNC: 100 MMOL/L (ref 98–107)
CO2 SERPL-SCNC: 22.2 MMOL/L (ref 22–29)
CREAT SERPL-MCNC: 0.77 MG/DL (ref 0.57–1)
EGFRCR SERPLBLD CKD-EPI 2021: 89 ML/MIN/1.73
GLUCOSE SERPL-MCNC: 161 MG/DL (ref 65–99)
HCT VFR BLD AUTO: 32.2 % (ref 34–46.6)
HGB BLD-MCNC: 10.8 G/DL (ref 12–15.9)
POTASSIUM SERPL-SCNC: 4.2 MMOL/L (ref 3.5–5.2)
SODIUM SERPL-SCNC: 138 MMOL/L (ref 136–145)

## 2023-10-06 PROCEDURE — 63710000001 MELOXICAM 15 MG TABLET: Performed by: ORTHOPAEDIC SURGERY

## 2023-10-06 PROCEDURE — 63710000001 LEVOTHYROXINE 150 MCG TABLET: Performed by: ORTHOPAEDIC SURGERY

## 2023-10-06 PROCEDURE — A9270 NON-COVERED ITEM OR SERVICE: HCPCS | Performed by: ORTHOPAEDIC SURGERY

## 2023-10-06 PROCEDURE — 80048 BASIC METABOLIC PNL TOTAL CA: CPT | Performed by: ORTHOPAEDIC SURGERY

## 2023-10-06 PROCEDURE — G0378 HOSPITAL OBSERVATION PER HR: HCPCS

## 2023-10-06 PROCEDURE — 85018 HEMOGLOBIN: CPT | Performed by: ORTHOPAEDIC SURGERY

## 2023-10-06 PROCEDURE — 63710000001 SENNOSIDES-DOCUSATE 8.6-50 MG TABLET: Performed by: ORTHOPAEDIC SURGERY

## 2023-10-06 PROCEDURE — 97530 THERAPEUTIC ACTIVITIES: CPT

## 2023-10-06 PROCEDURE — 25010000002 CEFAZOLIN IN DEXTROSE 2-4 GM/100ML-% SOLUTION: Performed by: ORTHOPAEDIC SURGERY

## 2023-10-06 PROCEDURE — 85014 HEMATOCRIT: CPT | Performed by: ORTHOPAEDIC SURGERY

## 2023-10-06 PROCEDURE — 63710000001 CYCLOBENZAPRINE 10 MG TABLET: Performed by: ORTHOPAEDIC SURGERY

## 2023-10-06 PROCEDURE — 25010000002 KETOROLAC TROMETHAMINE PER 15 MG: Performed by: ORTHOPAEDIC SURGERY

## 2023-10-06 PROCEDURE — 63710000001 METFORMIN ER 500 MG TABLET SUSTAINED-RELEASE 24 HOUR: Performed by: ORTHOPAEDIC SURGERY

## 2023-10-06 PROCEDURE — 63710000001 HYDROCODONE-ACETAMINOPHEN 10-325 MG TABLET: Performed by: ORTHOPAEDIC SURGERY

## 2023-10-06 PROCEDURE — 63710000001 ASPIRIN 81 MG TABLET DELAYED-RELEASE: Performed by: ORTHOPAEDIC SURGERY

## 2023-10-06 PROCEDURE — 97110 THERAPEUTIC EXERCISES: CPT

## 2023-10-06 PROCEDURE — 63710000001 PANTOPRAZOLE 40 MG TABLET DELAYED-RELEASE: Performed by: ORTHOPAEDIC SURGERY

## 2023-10-06 RX ADMIN — MELOXICAM 15 MG: 15 TABLET ORAL at 08:01

## 2023-10-06 RX ADMIN — HYDROCODONE BITARTRATE AND ACETAMINOPHEN 2 TABLET: 10; 325 TABLET ORAL at 11:00

## 2023-10-06 RX ADMIN — CYCLOBENZAPRINE 10 MG: 10 TABLET, FILM COATED ORAL at 08:01

## 2023-10-06 RX ADMIN — CEFAZOLIN SODIUM 2000 MG: 2 INJECTION, SOLUTION INTRAVENOUS at 02:22

## 2023-10-06 RX ADMIN — METFORMIN HYDROCHLORIDE 1000 MG: 500 TABLET, EXTENDED RELEASE ORAL at 08:01

## 2023-10-06 RX ADMIN — PANTOPRAZOLE SODIUM 40 MG: 40 TABLET, DELAYED RELEASE ORAL at 06:28

## 2023-10-06 RX ADMIN — HYDROCODONE BITARTRATE AND ACETAMINOPHEN 2 TABLET: 10; 325 TABLET ORAL at 06:28

## 2023-10-06 RX ADMIN — HYDROCODONE BITARTRATE AND ACETAMINOPHEN 1 TABLET: 10; 325 TABLET ORAL at 02:21

## 2023-10-06 RX ADMIN — SENNOSIDES AND DOCUSATE SODIUM 1 TABLET: 50; 8.6 TABLET ORAL at 08:01

## 2023-10-06 RX ADMIN — ASPIRIN 81 MG: 81 TABLET, COATED ORAL at 08:01

## 2023-10-06 RX ADMIN — LEVOTHYROXINE SODIUM 150 MCG: 0.15 TABLET ORAL at 06:28

## 2023-10-06 RX ADMIN — KETOROLAC TROMETHAMINE 30 MG: 30 INJECTION, SOLUTION INTRAMUSCULAR at 04:30

## 2023-10-06 NOTE — PROGRESS NOTES
"     Orthopedic Progress Note    Subjective :   Resting comfortably in bed.  No complaints or issues.  Pain improving.    Objective :    Vital signs in last 24 hours:  Temp:  [97.4 °F (36.3 °C)-98.4 °F (36.9 °C)] 98 °F (36.7 °C)  Heart Rate:  [] 88  Resp:  [14-18] 16  BP: (102-143)/(64-85) 102/66  Vitals:    10/05/23 1650 10/05/23 2012 10/06/23 0203 10/06/23 0553   BP: 134/74 120/75 113/73 102/66   BP Location: Right arm  Right arm Left arm   Patient Position: Lying  Lying Lying   Pulse: 97 85 91 88   Resp: 16  16 16   Temp: 98.4 °F (36.9 °C)  98 °F (36.7 °C) 98 °F (36.7 °C)   TempSrc: Oral  Oral Oral   SpO2: 95%  97% 98%   Weight:  101 kg (222 lb 10.6 oz)     Height:  162.6 cm (64\")         PHYSICAL EXAM:  Patient is calm, in no acute distress, awake and oriented x 3.  Dressing clean, dry and intact.  No signs of infection.  Swelling is appropriate.  Ecchymosis is appropriate in amount.  Homans test is negative.  Patient is neurovascularly intact distally.  Minimal numbness over the toes.  Improved from yesterday.      LABS:  Results from last 7 days   Lab Units 10/06/23  0449   HEMOGLOBIN g/dL 10.8*   HEMATOCRIT % 32.2*     Results from last 7 days   Lab Units 10/06/23  0449   SODIUM mmol/L 138   POTASSIUM mmol/L 4.2   CHLORIDE mmol/L 100   CO2 mmol/L 22.2   BUN mg/dL 9   CREATININE mg/dL 0.77   GLUCOSE mg/dL 161*   CALCIUM mg/dL 8.6           ASSESSMENT:  Status post left total knee arthroplasty, postoperative day 1.    Plan:  Continue Physical Therapy, weightbearing as tolerated.  Continue SCDs, Continue aspirin 81 mg p.o. twice daily for DVT prophylaxis.  Dispo planning for today.      Tung Sandoval MD    Date: 10/6/2023  Time: 08:07 EDT  "

## 2023-10-06 NOTE — PLAN OF CARE
Goal Outcome Evaluation:  Plan of Care Reviewed With: patient        Progress: improving  Outcome Evaluation: VSS, RA, SL, up with assist of 1, voiding per BRP, pain tolerable with 1 tab, numbness to left foot better, educated on BP monitoring, home 10/6

## 2023-10-06 NOTE — PLAN OF CARE
Goal Outcome Evaluation:              Outcome Evaluation: 57 y/o s/POD 1 L TKA. AOX4. Pt up w/ assist x1. Voiding function intact, Neuro checks WNL. PIV removed. ACE wrap and waterproof dsg c/d/i. Pain managed via PO pills. Needs met. VSS. RA. Educated pt on d/c instructions. D/C home w/ HH and family support. Meds to bed completed.

## 2023-10-06 NOTE — DISCHARGE SUMMARY
Discharge Summary    Date of Admission: 10/5/2023  5:59 AM  Date of Discharge:  10/6/2023    Discharge Diagnosis:   History of total hip arthroplasty [Z96.649]  Status post total knee replacement, left [Z96.652]      PMHX:   Past Medical History:   Diagnosis Date    Anesthesia complication     AFTER C SECTION LOST VISION FOR 24 HOURS    Anesthesia complication     DIAPHRAGM PARALYZED AFTER SHOULDER SURGERY    Anxiety     Arthritis     Chronic pain     BACK    Constipation     DDD (degenerative disc disease), lumbar     Diabetes mellitus     Diverticular disease     GERD (gastroesophageal reflux disease)     History of COVID-19 02/2021    HOSPITALIZED AT [facility]    Hyperlipidemia     Hypertension     Hypothyroidism     Iron deficiency anemia     Migraine     PONV (postoperative nausea and vomiting)     PPD positive     1 YEAR OF TREATMENT    Sinus infection     ON CURRENT ANTIBX NAD STERIOD PACK (STARTED ON 9/19/23)    Sleep apnea     USES CPAP RARELY    Tachycardia     Umbilical hernia        Discharge Disposition  Home or Self Care    Procedures Performed  Procedure(s):  LEFT TOTAL KNEE ARTHROPLASTY       Indication for Admission  Patient is a 58 y.o. female admitted after undergoing the above surgical procedure.  She was admitted for post-operative pain control, medical management and physical therapy.  She progressed with physical therapy.  She was deemed stable for discharge.      Consults:   Consults       No orders found for last 30 day(s).            Discharge Instructions:  Patient is weight bearing as tolerated on the operative leg.  Patient is to progress range of motion and ambulation as tolerated.  Use walker as needed for stability and gait.  May progress to cane as tolerated.  The dressing should be left on knee until post-operative day 7, and then removed.  Dressing is waterproof. Patient may shower.  Use ace wrap as needed for swelling.  Patient will follow-up in the office in 2 weeks.   Call the office at [phone] for any questions or concerns.      Discharge Medications     Discharge Medications        New Medications        Instructions Start Date   aspirin 81 MG EC tablet   81 mg, Oral, 2 Times Daily      cephalexin 500 MG capsule  Commonly known as: KEFLEX   500 mg, Oral, 4 Times Daily      HYDROcodone-acetaminophen  MG per tablet  Commonly known as: NORCO  Replaces: HYDROcodone-acetaminophen 7.5-325 MG per tablet   Take 1 tablet by mouth Every 4 (Four) Hours As Needed for Moderate Pain             Continue These Medications        Instructions Start Date   amitriptyline 25 MG tablet  Commonly known as: ELAVIL   25 mg, Oral, Nightly      aspirin-acetaminophen-caffeine 250-250-65 MG per tablet  Commonly known as: EXCEDRIN MIGRAINE   1 tablet, Oral, Every 6 Hours PRN, TO HOLD 1 WEEK PRIOR TO OR       atenolol 50 MG tablet  Commonly known as: TENORMIN   50 mg, Oral, Every Evening      cyclobenzaprine 10 MG tablet  Commonly known as: FLEXERIL   10 mg, Oral, 3 Times Daily      diclofenac 75 MG EC tablet  Commonly known as: VOLTAREN   75 mg, Oral, 2 Times Daily, INSTRUCTED PT TO FOLLOW MD INSTRUCTIONS REGARDING HOLDING FOR SURGERY       FIBER-CAPS PO   1 tablet, Oral, Daily      Flaxseed Oil 1000 MG capsule   1,000 mg, Oral, Daily, TO HOLD 2 WEEKS PRIOR TO OR       furosemide 20 MG tablet  Commonly known as: LASIX   20 mg, Oral, As Needed      Iron-Vitamin C  MG tablet   1 tablet, Oral, 2 Times Daily      L-Lysine 1000 MG tablet   1,000 mg, Oral, As Needed      levothyroxine 150 MCG tablet  Commonly known as: SYNTHROID, LEVOTHROID   150 mcg, Oral, Daily      loratadine-pseudoephedrine  MG per 24 hr tablet  Commonly known as: CLARITIN-D 24-hour   1 tablet, Oral, Daily PRN, TO HOLD 5-7 DAYS PRIOR TO OR       magnesium oxide 400 MG tablet  Commonly known as: MAG-OX   500 mg, Oral, As Needed      metFORMIN  MG 24 hr tablet  Commonly known as: GLUCOPHAGE-XR   1,000 mg, Oral, 2  Times Daily      methylPREDNISolone 4 MG dose pack  Commonly known as: MEDROL   Oral, Take as directed on package instructions.      mometasone 50 MCG/ACT nasal spray  Commonly known as: NASONEX   2 sprays, Nasal, As Needed      montelukast 10 MG tablet  Commonly known as: SINGULAIR   10 mg, Oral, Nightly      omeprazole 40 MG capsule  Commonly known as: priLOSEC   40 mg, Oral, Daily      pravastatin 80 MG tablet  Commonly known as: PRAVACHOL   80 mg, Oral, Nightly      promethazine 25 MG tablet  Commonly known as: PHENERGAN   25 mg, Oral, Every 6 Hours PRN      rizatriptan MLT 10 MG disintegrating tablet  Commonly known as: MAXALT-MLT   10 mg, Translingual, Once As Needed, May repeat in 2 hours if needed       VITAMIN C PO   1,000 mg, Oral, Daily      zolpidem 10 MG tablet  Commonly known as: AMBIEN   10 mg, Oral, Nightly             Stop These Medications      HYDROcodone-acetaminophen 7.5-325 MG per tablet  Commonly known as: NORCO  Replaced by: HYDROcodone-acetaminophen  MG per tablet              Discharge Diet:     Activity at Discharge:   Activity Instructions       Patient May Shower; No Tub Baths, Pools or Hot Tubs      Range of Motion      Weight Bearing Status      Weight Bearing Status: As Tolerated            Follow-up Appointments  No future appointments.  Additional Instructions for the Follow-ups that You Need to Schedule       Apply Ice to Affected Knee Every 2 Hours   As directed      Discharge Follow-up with Specified Provider: Lori; 2 Weeks   As directed      To: Lori   Follow Up: 2 Weeks        Remove Dressing   As directed      Remove Ace bandage on postoperative day 1.  Remove Tegaderm dressing on postoperative day 7.    Order Comments: Remove Ace bandage on postoperative day 1.  Remove Tegaderm dressing on postoperative day 7.                      Tung Sandoval MD  10/06/23,  08:08 EDT

## 2023-10-06 NOTE — THERAPY DISCHARGE NOTE
Patient Name: Shantell Royal  : 1965    MRN: [id number]                              Today's Date: 10/6/2023       Admit Date: 10/5/2023    Visit Dx:     ICD-10-CM ICD-9-CM   1. Status post total knee replacement, right  Z96.651 V43.65     Patient Active Problem List   Diagnosis    Status post total knee replacement, right    History of total hip arthroplasty    Status post total knee replacement, left     Past Medical History:   Diagnosis Date    Anesthesia complication     AFTER C SECTION LOST VISION FOR 24 HOURS    Anesthesia complication     DIAPHRAGM PARALYZED AFTER SHOULDER SURGERY    Anxiety     Arthritis     Chronic pain     BACK    Constipation     DDD (degenerative disc disease), lumbar     Diabetes mellitus     Diverticular disease     GERD (gastroesophageal reflux disease)     History of COVID-19 2021    HOSPITALIZED AT [facility]    Hyperlipidemia     Hypertension     Hypothyroidism     Iron deficiency anemia     Migraine     PONV (postoperative nausea and vomiting)     PPD positive     1 YEAR OF TREATMENT    Sinus infection     ON CURRENT ANTIBX NAD STERIOD PACK (STARTED ON 23)    Sleep apnea     USES CPAP RARELY    Tachycardia     Umbilical hernia      Past Surgical History:   Procedure Laterality Date    APPENDECTOMY      CARPAL TUNNEL RELEASE Right      SECTION      GALLBLADDER SURGERY      KNEE ARTHROSCOPY Right     LUMBAR FUSION      ROTATOR CUFF REPAIR Right     SALPINGO OOPHORECTOMY Left     TONSILLECTOMY      TOTAL KNEE ARTHROPLASTY Right 02/15/2022    Procedure: RIGHT TOTAL KNEE ARTHROPLASTY;  Surgeon: Tung Sandoval MD;  Location: [facility] OR;  Service: Orthopedics;  Laterality: Right;    TOTAL KNEE ARTHROPLASTY Left 10/5/2023    Procedure: LEFT TOTAL KNEE ARTHROPLASTY;  Surgeon: Tung Sandoval MD;  Location: [facility];  Service: Orthopedics;  Laterality: Left;    UMBILICAL HERNIA REPAIR      VENTRAL HERNIA REPAIR        General Information        Row Name 10/06/23 0942          Physical Therapy Time and Intention    Document Type discharge treatment  Pt not discharged yesterday; kept overnight for observation.  -EE     Mode of Treatment physical therapy;individual therapy  -EE       Row Name 10/06/23 0942          General Information    Existing Precautions/Restrictions fall  -EE     Barriers to Rehab none identified  -EE       Row Name 10/06/23 0942          Stairs Within Home, Primary    Number of Stairs, Within Home, Primary two  -EE       Row Name 10/06/23 0942          Cognition    Orientation Status (Cognition) oriented x 4  -EE       Row Name 10/06/23 0942          Safety Issues, Functional Mobility    Impairments Affecting Function (Mobility) endurance/activity tolerance;range of motion (ROM);strength;pain  -EE               User Key  (r) = Recorded By, (t) = Taken By, (c) = Cosigned By      Initials Name Provider Type    EE Jess Lim, PT Physical Therapist                   Mobility       Row Name 10/06/23 0943          Bed Mobility    Bed Mobility supine-sit  -EE     Supine-Sit [place] (Bed Mobility) independent  -EE       Row Name 10/06/23 0943          Sit-Stand Transfer    Sit-Stand [place] (Transfers) standby assist;verbal cues  -EE     Assistive Device (Sit-Stand Transfers) walker, front-wheeled  -EE     Comment, (Sit-Stand Transfer) x1 from EOB, x1 from chair  -EE       Row Name 10/06/23 0943          Gait/Stairs (Locomotion)    [place] Level (Gait) standby assist;verbal cues  -EE     Assistive Device (Gait) walker, front-wheeled  -EE     Distance in Feet (Gait) 70' x 1  -EE     Deviations/Abnormal Patterns (Gait) matt decreased;left sided deviations;antalgic  -EE     Bilateral Gait Deviations forward flexed posture  -EE     Left Sided Gait Deviations heel strike decreased;weight shift ability decreased  -EE     [place] Level (Stairs) contact guard;verbal cues  -EE     Handrail Location (Stairs) both sides  -EE      Number of Steps (Stairs) 2  -EE     Ascending Technique (Stairs) step-to-step  -EE     Descending Technique (Stairs) step-to-step  -EE     Comment, (Gait/Stairs) Cues for sequencing on stairs. Pt demos no LOB or safety concerns with mobility.  -EE               User Key  (r) = Recorded By, (t) = Taken By, (c) = Cosigned By      Initials Name Provider Type    EE Jess Lim, CHRISTIE Physical Therapist                   Obj/Interventions       Row Name 10/06/23 0944          Range of Motion Comprehensive    Comment, General Range of Motion L knee AROM grossly 10-75, pain limiting  -EE       Row Name 10/06/23 0944          Motor Skills    Therapeutic Exercise --  L TKA protocol x 15 reps  -EE               User Key  (r) = Recorded By, (t) = Taken By, (c) = Cosigned By      Initials Name Provider Type    EE Jess Lim, CHRISTIE Physical Therapist                   Goals/Plan    No documentation.                  Clinical Impression       Row Name 10/06/23 0944          Pain    Pretreatment Pain Rating 5/10  -EE     Posttreatment Pain Rating 7/10  -EE     Pain Location - Side/Orientation Left  -EE     Pain Location incisional  -EE     Pain Location - knee  -EE     Pain Intervention(s) Repositioned;Elevated;Cold applied;Medication (See MAR)  -EE       Row Name 10/06/23 0944          Plan of Care Review    Plan of Care Reviewed With patient  -EE     Progress improving  -EE     Outcome Evaluation Pt demos improved strength and activity tolerance from yesterday. L foot numbness also resolved overnight. Pt able to tolerate increased ambulation distance, ambulating 70' with rwx and SBA. Pt also safely able to negotiate 2 stairs with CGA. Pt completed L TKA exercises and verbalizes understanding of HEP. Pt has rwx at home and plans to DC home later today and continue with HH PT. No problems anticipated.  -EE       Row Name 10/06/23 0944          Positioning and Restraints    Pre-Treatment Position in bed  -EE     Post Treatment  Position chair  -EE     In Chair reclined;call light within reach;encouraged to call for assist;legs elevated;notified nsg  -EE               User Key  (r) = Recorded By, (t) = Taken By, (c) = Cosigned By      Initials Name Provider Type    Jess Coffey PT Physical Therapist                   Outcome Measures       Row Name 10/06/23 0946          How much help from another person do you currently need...    Turning from your back to your side while in flat bed without using bedrails? 4  -EE     Moving from lying on back to sitting on the side of a flat bed without bedrails? 4  -EE     Moving to and from a bed to a chair (including a wheelchair)? 3  -EE     Standing up from a chair using your arms (e.g., wheelchair, bedside chair)? 3  -EE     Climbing 3-5 steps with a railing? 3  -EE     To walk in hospital room? 3  -EE     AM-PAC 6 Clicks Score (PT) 20  -EE     Highest level of mobility 6 --> Walked 10 steps or more  -EE               User Key  (r) = Recorded By, (t) = Taken By, (c) = Cosigned By      Initials Name Provider Type    Jess Coffey PT Physical Therapist                  Physical Therapy Education       Title: PT OT SLP Therapies (Resolved)       Topic: Physical Therapy (Resolved)       Point: Mobility training (Resolved)       Learning Progress Summary             Patient Acceptance, E,TB, VU,DU by EE at 10/6/2023 0946    Acceptance, E,TB, VU,DU by EE at 10/5/2023 1454                         Point: Home exercise program (Resolved)       Learning Progress Summary             Patient Acceptance, E,TB, VU,DU by EE at 10/6/2023 0946    Acceptance, E,TB, VU,DU by EE at 10/5/2023 1454                         Point: Body mechanics (Resolved)       Learning Progress Summary             Patient Acceptance, E,TB, VU,DU by EE at 10/6/2023 0946    Acceptance, E,TB, VU,DU by EE at 10/5/2023 1454                         Point: Precautions (Resolved)       Learner Progress:  Not documented in this visit.                               User Key       Initials Effective Dates Name Provider Type Discipline    EE 06/16/21 -  Jess Lim PT Physical Therapist PT                  PT Recommendation and Plan     Plan of Care Reviewed With: patient  Progress: improving  Outcome Evaluation: Pt demos improved strength and activity tolerance from yesterday. L foot numbness also resolved overnight. Pt able to tolerate increased ambulation distance, ambulating 70' with rwx and SBA. Pt also safely able to negotiate 2 stairs with CGA. Pt completed L TKA exercises and verbalizes understanding of HEP. Pt has rwx at home and plans to DC home later today and continue with HH PT. No problems anticipated.     Time Calculation:         PT Charges       Row Name 10/06/23 0946             Time Calculation    Start Time 0802  -EE      Stop Time 0832  -EE      Time Calculation (min) 30 min  -EE      PT Received On 10/06/23  -EE         Time Calculation- PT    Total Timed Code Minutes- PT 30 minute(s)  -EE         Timed Charges    19639 - PT Therapeutic Exercise Minutes 12  -EE      56626 - PT Therapeutic Activity Minutes 18  -EE         Total Minutes    Timed Charges Total Minutes 30  -EE       Total Minutes 30  -EE                User Key  (r) = Recorded By, (t) = Taken By, (c) = Cosigned By      Initials Name Provider Type    EE Jess Lim PT Physical Therapist                  Therapy Charges for Today       Code Description Service Date Service Provider Modifiers Qty    69705639600 HC PT EVAL LOW COMPLEXITY 2 10/5/2023 Jess Lim, PT GP 1    35438886885 HC PT THERAPEUTIC ACT EA 15 MIN 10/5/2023 Jess Lim, PT GP 1    64324380587 HC PT THER PROC EA 15 MIN 10/6/2023 Jess Lim, PT  1    51196699242 HC PT THERAPEUTIC ACT EA 15 MIN 10/6/2023 Jess Lim, PT  1            PT G-Codes  Outcome Measure Options: AM-PAC 6 Clicks Basic Mobility (PT)  AM-PAC 6 Clicks Score (PT): 20    PT Discharge Summary  Anticipated Discharge Disposition (PT):  home with assist, home with home health    Jess Lim, PT  10/6/2023

## 2023-10-06 NOTE — CASE MANAGEMENT/SOCIAL WORK
Discharge Planning Assessment  [facility]     Patient Name: Shantell Royal  MRN: [id number]  Today's Date: 10/6/2023    Admit Date: 10/5/2023    Plan: home with spouse and [facility] to follow   Discharge Needs Assessment       Row Name 10/06/23 0804       Living Environment    People in Home spouse    Name(s) of People in Home spouse, Emanuel Royal    Current Living Arrangements home    Potentially Unsafe Housing Conditions none    Primary Care Provided by self    Provides Primary Care For no one    Family Caregiver if Needed spouse    Quality of Family Relationships helpful;involved;supportive    Able to Return to Prior Arrangements yes       Resource/Environmental Concerns    Resource/Environmental Concerns home accessibility  2 steps to enter    Transportation Concerns none       Transition Planning    Patient/Family Anticipates Transition to home with help/services;home with family    Patient/Family Anticipated Services at Transition home health care    Transportation Anticipated family or friend will provide       Discharge Needs Assessment    Equipment Currently Used at Home cane, straight;cpap                   Discharge Plan       Row Name 10/06/23 0801       Plan    Plan home with spouse and [facility] to follow    Patient/Family in Agreement with Plan yes    Plan Comments Spoke with patient at bedside.Introduced self and explained role. Facesheet verified. Patient lives with her , Emanuel Royal, in a a single story house with 2 steps to enter. She does have a walker and elevated commode. Discussed HH and patient states that she has been contacted by [facility] and [facility], but would prefer to use [facility]. Referral placed in [facility] amd they have accepted. Call placed to Amanda/[facility] to update that patient will be discharging home with [facility]. Spouse will transport home at [place]. [facility]CP will follow.                  Continued Care and Services - Admitted Since 10/5/2023       Home Medical Care Coordination  complete.      Service Provider Request Status Selected Services Address Phone Fax Patient Preferred    [facility]  Selected Home Health Services [address] [facility] [address] [phone] --                  Expected Discharge Date and Time       Expected Discharge Date Expected Discharge Time    Oct 5, 2023            Demographic Summary       Row Name 10/06/23 0804       General Information    Admission Type observation    Arrived From PACU/recovery room    Referral Source admission list    Reason for Consult discharge planning    Preferred Language English                   Functional Status       Row Name 10/06/23 0804       Functional Status    Usual Activity Tolerance good    Current Activity Tolerance moderate       Functional Status, IADL    Medications independent    Meal Preparation independent    Housekeeping independent    Laundry independent    Shopping independent       Mental Status    General Appearance WDL WDL                   Psychosocial    No documentation.                  Abuse/Neglect    No documentation.                  Legal    No documentation.                  Substance Abuse    No documentation.                  Patient Forms    No documentation.                     Paty Ivey RN

## 2023-10-06 NOTE — PLAN OF CARE
Goal Outcome Evaluation:  Plan of Care Reviewed With: patient        Progress: improving  Outcome Evaluation: Pt demos improved strength and activity tolerance from yesterday. L foot numbness also resolved overnight. Pt able to tolerate increased ambulation distance, ambulating 70' with rwx and SBA. Pt also safely able to negotiate 2 stairs with CGA. Pt completed L TKA exercises and verbalizes understanding of HEP. Pt has rwx at home and plans to DC home later today and continue with HH PT. No problems anticipated.      Anticipated Discharge Disposition (PT): home with assist, home with home health

## 2023-10-06 NOTE — PLAN OF CARE
Goal Outcome Evaluation:  PT is SPO for Left total knee. Alert and O x 4. Out PT discharge cancelled due to numbness to bottom of left foot. Pt denies any discomfort. On RA. VSS. Needs met. WCTM

## 2023-10-09 ENCOUNTER — TELEPHONE (OUTPATIENT)
Dept: ORTHOPEDIC SURGERY | Facility: HOSPITAL | Age: 58
End: 2023-10-09
Payer: MEDICARE

## 2023-10-09 NOTE — TELEPHONE ENCOUNTER
Post op day 3  Discharge Instructions:  Ask patient about his or her discharge instructions  ?  Patient confirmed understanding   []  Further instruction needed   What, if any, recommendations, teaching, or interventions did you provide? Click or tap here to enter text.  Health status:  Pain controlled Yes   Does have some increased pain but the pain medication does seem to be helping for the most part.   Recommended interventions:  Yes  incision/dressing status   ?  Clean without redness, drainage, odor  []  Redness    []  Drainage - color Click or tap here to enter text.  []  Odor  LATESHA - Green light blinking Choose an item.  Difficulties urination No  Last BM 10/6/2023 (if no BM by day 3-recommend OTC suppository or fleets enema)  No Bm yet, taking the medication. Options given.   Medications:  ?Medications reviewed with patient/family/caregiver  Patient taking medications as prescribed?   Yes  If not taking medications as prescribed, note specific medicine(s) and reason for each:  Click or tap here to enter text.  Hospital Follow Up Plan:  Follow up Appointment with Orthopedic surgeon:  ?Has f/u appointment                []Scheduled f/u appointment  Home Care ordered at discharge?    Yes        Home Care started, or contact made?    Yes   If no, action taken:   DME obtained/used in home?         Yes   Using IS  Yes   Other information: Ms. Royal said she is doing ok. She was to be a same day discharge and was having issues with her Left foot causing to have to stay. She was able to go home the following day but states she is still having some issues. She has a lot of bruising. She is icing and elevating. She does have some increased pain. Taking the medication and she says it does help for the most part. PT is coming to see her for the first time today. Told her to let me know if she doesn't hear from them by this afternoon. She voiced understanding. The dressing was changed because she was itching so bad and  the tegaderm ripped. Told her to let PT know to place a new dressing today. She feels she is allergic to the glue. Advised her to let Dr. Sandoval know. She said the incision looks good. She hasn't had a BM yet, taking the medications other options given at this time. Ms. Royal doesn't have any other questions for me at this time. She has my contact information should she need anything.

## 2023-10-17 ENCOUNTER — TELEPHONE (OUTPATIENT)
Dept: ORTHOPEDIC SURGERY | Facility: HOSPITAL | Age: 58
End: 2023-10-17
Payer: MEDICARE

## 2023-10-17 NOTE — TELEPHONE ENCOUNTER
Called and spoke with Ms. Royal to see how she is doing as she is 2 weeks SP LTK. She said she is doing ok. She is still having quite a bit of pain. She is taking the medication and they called her in tramadol to take for breakthrough pain. It helps some, but yesterday and today are rough. She is having trouble sleeping as well. She had graduated HH PT and starts with OP tomorrow and has her F/U with the MD Friday. She still has itching, as she feels she is allergic to the glue. Dressing is off and incision looks good. Ms. Royal doesn't have any questions for me at this time. She has my contact information should she need anything.

## 2024-07-15 ENCOUNTER — TRANSCRIBE ORDERS (OUTPATIENT)
Dept: ADMINISTRATIVE | Facility: HOSPITAL | Age: 59
End: 2024-07-15
Payer: MEDICARE

## 2024-07-15 DIAGNOSIS — J32.1 CHRONIC FRONTAL SINUSITIS: Primary | ICD-10-CM

## 2024-07-22 ENCOUNTER — HOSPITAL ENCOUNTER (OUTPATIENT)
Dept: CT IMAGING | Facility: HOSPITAL | Age: 59
Discharge: HOME | End: 2024-07-22
Admitting: NURSE PRACTITIONER
Payer: MEDICARE

## 2024-07-22 DIAGNOSIS — J32.1 CHRONIC FRONTAL SINUSITIS: ICD-10-CM

## 2024-07-22 PROCEDURE — 70486 CT MAXILLOFACIAL W/O DYE: CPT

## (undated) DEVICE — APPL CHLORAPREP HI/LITE 26ML ORNG

## (undated) DEVICE — TRAP FLD MINIVAC MEGADYNE 100ML

## (undated) DEVICE — STRAP STIRUP WO/ RNG

## (undated) DEVICE — TBG PENCL TELESCP MEGADYNE SMOKE EVAC 10FT

## (undated) DEVICE — SOL IRR NACL 0.9PCT 3000ML

## (undated) DEVICE — BNDG ELAS ELITE V/CLOSE 4IN 5YD LF STRL

## (undated) DEVICE — PENCL E/S ULTRAVAC TELESCP NOSE HOLSTR 10FT

## (undated) DEVICE — DRAPE,REIN 53X77,STERILE: Brand: MEDLINE

## (undated) DEVICE — Device

## (undated) DEVICE — PREP SOL POVIDONE/IODINE BT 4OZ

## (undated) DEVICE — DUAL CUT SAGITTAL BLADE

## (undated) DEVICE — SUT VICRYL 1 CT1 27IN  JJ40G

## (undated) DEVICE — GLV SURG PREMIERPRO ORTHO LTX PF SZ8 BRN

## (undated) DEVICE — GLV SURG BIOGEL LTX PF 8

## (undated) DEVICE — ADHS SKIN SURG TISS VISC PREMIERPRO EXOFIN HI/VISC FAST/DRY

## (undated) DEVICE — DRAPE,U/ SHT,SPLIT,PLAS,STERIL: Brand: MEDLINE

## (undated) DEVICE — NEEDLE, QUINCKE, 18GX3.5": Brand: MEDLINE

## (undated) DEVICE — PK KN TOTL 40

## (undated) DEVICE — ANTIBACTERIAL UNDYED BRAIDED (POLYGLACTIN 910), SYNTHETIC ABSORBABLE SUTURE: Brand: COATED VICRYL

## (undated) DEVICE — DRSNG SURESITE123 4X10IN

## (undated) DEVICE — PATIENT RETURN ELECTRODE, SINGLE-USE, CONTACT QUALITY MONITORING, ADULT, WITH 9FT CORD, FOR PATIENTS WEIGING OVER 33LBS. (15KG): Brand: MEGADYNE

## (undated) DEVICE — DRSNG TELFA PAD NONADH STR 1S 3X8IN

## (undated) DEVICE — GLV SURG SIGNATURE ESSENTIAL PF LTX SZ8.5

## (undated) DEVICE — BNDG ELAS ELITE V/CLOSE 6IN 5YD LF STRL

## (undated) DEVICE — IMPLANTABLE DEVICE
Type: IMPLANTABLE DEVICE | Site: KNEE | Status: NON-FUNCTIONAL
Brand: PERSONA™
Removed: 2022-02-15